# Patient Record
Sex: MALE | Race: WHITE | NOT HISPANIC OR LATINO | URBAN - METROPOLITAN AREA
[De-identification: names, ages, dates, MRNs, and addresses within clinical notes are randomized per-mention and may not be internally consistent; named-entity substitution may affect disease eponyms.]

---

## 2022-05-30 ENCOUNTER — HOSPITAL ENCOUNTER (EMERGENCY)
Facility: HOSPITAL | Age: 27
Discharge: HOME | End: 2022-05-30
Attending: EMERGENCY MEDICINE
Payer: MEDICAID

## 2022-05-30 VITALS
RESPIRATION RATE: 14 BRPM | SYSTOLIC BLOOD PRESSURE: 154 MMHG | HEART RATE: 86 BPM | DIASTOLIC BLOOD PRESSURE: 78 MMHG | WEIGHT: 190 LBS | HEIGHT: 68 IN | BODY MASS INDEX: 28.79 KG/M2 | TEMPERATURE: 98 F | OXYGEN SATURATION: 98 %

## 2022-05-30 DIAGNOSIS — F22 DELUSIONAL DISORDER: Primary | ICD-10-CM

## 2022-05-30 PROCEDURE — 99283 EMERGENCY DEPT VISIT LOW MDM: CPT

## 2022-05-30 RX ORDER — RISPERIDONE 1 MG/1
1 TABLET ORAL 2 TIMES DAILY
Qty: 60 TABLET | Refills: 1 | Status: ON HOLD | OUTPATIENT
Start: 2022-05-30 | End: 2023-11-15 | Stop reason: HOSPADM

## 2022-05-30 NOTE — ED NOTES
Pt presents with disorganized thinking, stating that their is a recorder behind his eye, and that their is black hairs spinning behind his eyes. Pt presents as being calm, non-aggressive. Pt states that he once swallowed a snake and it wrapped around his balls. Pt denies SI or HI.  MD made aware.

## 2022-05-30 NOTE — ED PROVIDER NOTES
Encounter Date: 5/30/2022       History     Chief Complaint   Patient presents with    Eye Problem    Psychiatric Evaluation     Pt reports he has black hairs in his eyes and something moving around in his abdomen, stating he needs a scan. Pt states last time he came here he had a snake wrapped around his balls and we didn't do anything about it. States he takes no medications and has no medical problems     This 26 y/o man presents with c/o black hairs spinning around his eyes cutting into them. He states his head feels hollow. He feels that he he has multiple contact lenses in both eyes. He came today because he states he had a CT of his head but never got the results. 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 He lives with his father. Chart review reveals that last year he had delusional thoughts, paranoia and hallucinations. He denies suicidal ideation or homicidal ideation.         Review of patient's allergies indicates:  No Known Allergies  History reviewed. No pertinent past medical history.  History reviewed. No pertinent surgical history.  History reviewed. No pertinent family history.     Review of Systems   Constitutional: Negative.    HENT: Negative.    Eyes: Positive for visual disturbance.   Respiratory: Negative.    Cardiovascular: Negative.    Gastrointestinal: Negative.    Endocrine: Negative.    Genitourinary: Negative.    Musculoskeletal: Negative.    Skin: Negative.    Allergic/Immunologic: Negative.    Neurological: Negative.    Hematological: Negative.    Psychiatric/Behavioral: Positive for hallucinations.   All other systems reviewed and are negative.      Physical Exam     Initial Vitals [05/30/22 0930]   BP Pulse Resp Temp SpO2   (!) 154/78 86 14 98.3 °F (36.8 °C) 98 %      MAP       --         Physical Exam    Constitutional: He appears well-developed and well-nourished.   HENT:   Head: Normocephalic and  atraumatic.   Mouth/Throat: Mucous membranes are normal.   Eyes: EOM are normal. Pupils are equal, round, and reactive to light.   Neck: Neck supple.   Normal range of motion.  Cardiovascular: Normal rate, regular rhythm, normal heart sounds and intact distal pulses.   Pulmonary/Chest: Breath sounds normal.   Abdominal: Abdomen is soft. Bowel sounds are normal.   Musculoskeletal:         General: Normal range of motion.      Cervical back: Normal range of motion and neck supple.     Neurological: He is alert and oriented to person, place, and time. He has normal strength.   Skin: Skin is warm and dry. Capillary refill takes less than 2 seconds.   Psychiatric: He has a normal mood and affect. His behavior is normal. Judgment normal.         ED Course   Procedures  Labs Reviewed - No data to display       Imaging Results    None          Medications - No data to display                       Clinical Impression:   Final diagnoses:  [F22] Delusional disorder (Primary)          ED Disposition Condition    Discharge Stable        ED Prescriptions     None        Follow-up Information     Follow up With Specialties Details Why Contact Info    [facility], Psychiatry, Psychology Schedule an appointment as soon as possible for a visit   [address] [place] [address] [address] [place] [id number] [phone]             Fly Candelaria MD  05/30/22 1011       Fly Candelaria MD  08/25/22 0982

## 2022-12-28 ENCOUNTER — HOSPITAL ENCOUNTER (EMERGENCY)
Facility: HOSPITAL | Age: 27
Discharge: HOME | End: 2022-12-28
Attending: STUDENT IN AN ORGANIZED HEALTH CARE EDUCATION/TRAINING PROGRAM
Payer: MEDICAID

## 2022-12-28 VITALS
HEIGHT: 63 IN | WEIGHT: 200 LBS | SYSTOLIC BLOOD PRESSURE: 118 MMHG | TEMPERATURE: 98 F | DIASTOLIC BLOOD PRESSURE: 68 MMHG | OXYGEN SATURATION: 98 % | RESPIRATION RATE: 16 BRPM | BODY MASS INDEX: 35.44 KG/M2 | HEART RATE: 78 BPM

## 2022-12-28 DIAGNOSIS — F19.10 DRUG ABUSE: Primary | ICD-10-CM

## 2022-12-28 PROCEDURE — 99282 EMERGENCY DEPT VISIT SF MDM: CPT

## 2022-12-29 NOTE — ED PROVIDER NOTES
"Encounter Date: 12/28/2022       History     Chief Complaint   Patient presents with    medical screening     Pt states he is having "multiple pops all over his body and when he closes his eyes he sees things shooting from his eyes". S/S are ongoing for a year. Denies SI or HI      Patient is a 27-year-old white male no significant past medical history presented to the ER today due to concerns about his eyes.  Patient states that he is struggled with this for several years.  Patient states the light seems to bother his eyes.  Patient states symptom onset was after he "snorted my cousins Adderall. "  Patient states he has been doing this for quite some time.  Patient states he does not to focus but does sometimes affect his eyes.  Per chart review appears patient has been to the ER several times for this.  Patient states he seen ophthalmology for it and says it is nothing wrong with him.  Patient denies any other complaints at this time.  Patient denies any headaches, chest pain, shortness of breath, diplopia, abdominal pain.    Review of patient's allergies indicates:  No Known Allergies  No past medical history on file.  No past surgical history on file.  No family history on file.     Review of Systems   Constitutional:  Negative for chills, fatigue and fever.   HENT:  Negative for congestion, sore throat and trouble swallowing.    Eyes:  Positive for photophobia and redness. Negative for pain, discharge, itching and visual disturbance.   Respiratory:  Negative for cough, shortness of breath and wheezing.    Cardiovascular:  Negative for chest pain and palpitations.   Gastrointestinal:  Negative for abdominal pain, blood in stool, constipation, diarrhea, nausea and vomiting.   Genitourinary:  Negative for dysuria and hematuria.   Musculoskeletal:  Negative for back pain and myalgias.   Skin:  Negative for rash and wound.   Neurological:  Negative for seizures, syncope and headaches.   Psychiatric/Behavioral:  " Negative for confusion. The patient is not nervous/anxious.      Physical Exam     Initial Vitals [12/28/22 2100]   BP Pulse Resp Temp SpO2   139/83 109 16 98.2 °F (36.8 °C) 97 %      MAP       --         Physical Exam    Nursing note and vitals reviewed.  Constitutional: He appears well-developed and well-nourished. No distress.   HENT:   Head: Normocephalic and atraumatic.   Eyes: EOM are normal. Right eye exhibits no discharge. Left eye exhibits no discharge. No scleral icterus.   Injected conjunctiva bilaterally.  Pupils dilated and equal.  Reactive to light.   Neck: No tracheal deviation present.   Normal range of motion.  Cardiovascular:  Normal rate, regular rhythm and normal heart sounds.     Exam reveals no gallop and no friction rub.       No murmur heard.  Pulmonary/Chest: Breath sounds normal. No respiratory distress. He has no wheezes. He has no rhonchi. He has no rales.   Musculoskeletal:         General: Normal range of motion.      Cervical back: Normal range of motion.     Neurological: He is alert.   Skin: Skin is warm and dry. No rash and no abscess noted. No erythema. No pallor.   Psychiatric: His behavior is normal. Judgment normal.       ED Course   Procedures  Labs Reviewed - No data to display       Imaging Results    None          Medications - No data to display  Medical Decision Making:   Initial Assessment:     Overall 27-year-old male seems to be in no acute distress  ED Management:   Vital signs stable patient is afebrile   Pupils equal round and reactive to light but dilated   Injected conjunctiva   Patient states snorted Adderall today and smoked marijuana  Do strongly feel the patient's symptoms are likely due to this and advised him to avoid in the future   Seems that patient has already been evaluated by Ophthalmology for same complaint and cleared medically   Return precautions discussed and follow up with PCP is recommended                        Clinical Impression:   Final  diagnoses:  [F19.10] Drug abuse (Primary)        ED Disposition Condition    Discharge Stable          ED Prescriptions    None       Follow-up Information       Follow up With Specialties Details Why Contact Info    [facility] - Emergency Dept Emergency Medicine  If symptoms worsen [address] [facility] [id number] [phone]    Myriam Nuñez MD Family Medicine Schedule an appointment as soon as possible for a visit   [address] [facility]  [facility] [place] [address]  [phone]               Mark Patterson MD  12/28/22 6093

## 2023-01-27 ENCOUNTER — HOSPITAL ENCOUNTER (EMERGENCY)
Facility: HOSPITAL | Age: 28
Discharge: HOME | End: 2023-01-27
Attending: STUDENT IN AN ORGANIZED HEALTH CARE EDUCATION/TRAINING PROGRAM
Payer: MEDICAID

## 2023-01-27 VITALS
TEMPERATURE: 98 F | OXYGEN SATURATION: 99 % | WEIGHT: 200 LBS | HEART RATE: 78 BPM | DIASTOLIC BLOOD PRESSURE: 92 MMHG | SYSTOLIC BLOOD PRESSURE: 126 MMHG | HEIGHT: 64 IN | BODY MASS INDEX: 34.15 KG/M2 | RESPIRATION RATE: 18 BRPM

## 2023-01-27 DIAGNOSIS — Z00.00 GENERAL MEDICAL EXAM: Primary | ICD-10-CM

## 2023-01-27 DIAGNOSIS — R03.0 ELEVATED BLOOD PRESSURE READING: ICD-10-CM

## 2023-01-27 LAB — POCT GLUCOSE: 80 MG/DL (ref 70–110)

## 2023-01-27 PROCEDURE — 82962 GLUCOSE BLOOD TEST: CPT

## 2023-01-27 PROCEDURE — 99282 EMERGENCY DEPT VISIT SF MDM: CPT

## 2023-01-27 NOTE — ED PROVIDER NOTES
"Encounter Date: 1/27/2023       History     Chief Complaint   Patient presents with    Medical clearence      Pt arrived via [facility], pt states he has been having sensations of the inside of his stomach itching and a popping noise that goes off in my head. Pt states this has been going on for the past year. Pt when questioned about SI, HI or AVH, pt denies SI or HI but does admit to hearing voices all of his life, for which he is currently not on medication for, when pt questioned on what the voices are telling him he states " only to do good things not anything bad" pt would not further elaborate on what the voic     28 yo Pt arrived via [facility], pt states his stomach "itching"  and feels things "moving around" in stomach, denies abd pain, n/v/diarrhea/constipation and a popping noise that goes off in my head. Pt states that all symptoms has been going on for the past year. Pt states he was sleeping in his truck at truck stop and called EMS.Pt when questioned about SI, HI or AVH, pt denies SI or HI but does admit to hearing voices all of his life, for which he is currently not on medication for, when pt questioned on what the voices are telling him he states " only to do good things not anything bad" pt would not further elaborate on what the voice.denies headache, sob,cp, n/v/d/c/or any other symptoms. Pt denies any pain or discomfort currently  .    Review of patient's allergies indicates:  No Known Allergies  History reviewed. No pertinent past medical history.  History reviewed. No pertinent surgical history.  History reviewed. No pertinent family history.     Review of Systems   Constitutional:  Negative for fever.   HENT:  Negative for sore throat.    Respiratory:  Negative for shortness of breath.    Cardiovascular:  Negative for chest pain.   Gastrointestinal:  Negative for nausea.   Genitourinary:  Negative for dysuria.   Musculoskeletal:  Negative for back pain.   Skin:  Negative for rash.   Neurological:  " Negative for weakness.   Hematological:  Does not bruise/bleed easily.   All other systems reviewed and are negative.    Physical Exam     Initial Vitals [01/27/23 0748]   BP Pulse Resp Temp SpO2   (!) 160/97 94 18 98.3 °F (36.8 °C) 97 %      MAP       --         Physical Exam    Constitutional: He appears well-developed and well-nourished.   Poor hygiene, holes in pants,   HENT:   Head: Normocephalic.   Eyes: EOM are normal. Pupils are equal, round, and reactive to light.   Neck:   Normal range of motion.  Cardiovascular:  Normal rate, regular rhythm and normal pulses.           Pulmonary/Chest: Breath sounds normal. No respiratory distress.   Abdominal: Abdomen is soft. Bowel sounds are normal. There is no abdominal tenderness.   Musculoskeletal:         General: Normal range of motion.      Cervical back: Normal range of motion.     Neurological: He is alert and oriented to person, place, and time. GCS score is 15. GCS eye subscore is 4. GCS verbal subscore is 5. GCS motor subscore is 6.   Skin: Skin is warm. Capillary refill takes less than 2 seconds.   Psychiatric: He has a normal mood and affect.       ED Course   Procedures  Labs Reviewed   POCT GLUCOSE          Imaging Results    None          Medications - No data to display  Medical Decision Making:   Initial Assessment:   26 yo otherwise well appearing male  Differential Diagnosis:   Substance abuse, GERD, migraine, hypertension  Clinical Tests:   Lab Tests: Ordered  ED Management:  Patient does not have any acute symptoms in ED today, explained that he would benefit from obtaining a primary care physician following up with his current listed problems primary physician.  Patient denies any pain or discomfort at this time CBG 80.  Does have a history of mental on a so this could be playing a part in his symptoms, no acute issues at this time, VSS except for elevated BP,  will discharge home with follow-up with PCP, ER precautions.                         Clinical Impression:   Final diagnoses:  [Z00.00] General medical exam (Primary)  [R03.0] Elevated blood pressure reading        ED Disposition Condition    Discharge Stable          ED Prescriptions    None       Follow-up Information       Follow up With Specialties Details Why Contact Info    Myriam Nuñez MD Family Medicine In 1 week  [address]  [place] [address]  [phone]               Danyelle Ward MD  01/27/23 0819

## 2023-11-12 ENCOUNTER — HOSPITAL ENCOUNTER (INPATIENT)
Facility: HOSPITAL | Age: 28
LOS: 4 days | Discharge: HOME | DRG: 885 | End: 2023-11-16
Attending: PSYCHIATRY & NEUROLOGY | Admitting: PSYCHIATRY & NEUROLOGY
Payer: MEDICAID

## 2023-11-12 DIAGNOSIS — F29 PSYCHOSIS, UNSPECIFIED PSYCHOSIS TYPE: ICD-10-CM

## 2023-11-12 PROCEDURE — 12400001 HC PSYCH SEMI-PRIVATE ROOM

## 2023-11-12 RX ORDER — IBUPROFEN 200 MG
1 TABLET ORAL DAILY
Status: DISCONTINUED | OUTPATIENT
Start: 2023-11-13 | End: 2023-11-16 | Stop reason: HOSPADM

## 2023-11-12 RX ORDER — LORAZEPAM 1 MG/1
2 TABLET ORAL EVERY 4 HOURS PRN
Status: DISCONTINUED | OUTPATIENT
Start: 2023-11-12 | End: 2023-11-16 | Stop reason: HOSPADM

## 2023-11-12 RX ORDER — TRAZODONE HYDROCHLORIDE 100 MG/1
100 TABLET ORAL NIGHTLY PRN
Status: DISCONTINUED | OUTPATIENT
Start: 2023-11-12 | End: 2023-11-16 | Stop reason: HOSPADM

## 2023-11-12 RX ORDER — HYDROXYZINE HYDROCHLORIDE 50 MG/1
50 TABLET, FILM COATED ORAL EVERY 4 HOURS PRN
Status: DISCONTINUED | OUTPATIENT
Start: 2023-11-12 | End: 2023-11-16 | Stop reason: HOSPADM

## 2023-11-12 RX ORDER — HALOPERIDOL 5 MG/1
10 TABLET ORAL EVERY 4 HOURS PRN
Status: DISCONTINUED | OUTPATIENT
Start: 2023-11-12 | End: 2023-11-16 | Stop reason: HOSPADM

## 2023-11-12 RX ORDER — LORAZEPAM 2 MG/ML
2 INJECTION INTRAMUSCULAR EVERY 4 HOURS PRN
Status: DISCONTINUED | OUTPATIENT
Start: 2023-11-12 | End: 2023-11-16 | Stop reason: HOSPADM

## 2023-11-12 RX ORDER — HALOPERIDOL 5 MG/ML
10 INJECTION INTRAMUSCULAR EVERY 4 HOURS PRN
Status: DISCONTINUED | OUTPATIENT
Start: 2023-11-12 | End: 2023-11-16 | Stop reason: HOSPADM

## 2023-11-12 RX ORDER — DIPHENHYDRAMINE HYDROCHLORIDE 50 MG/ML
50 INJECTION INTRAMUSCULAR; INTRAVENOUS EVERY 4 HOURS PRN
Status: DISCONTINUED | OUTPATIENT
Start: 2023-11-12 | End: 2023-11-16 | Stop reason: HOSPADM

## 2023-11-12 RX ORDER — ACETAMINOPHEN 325 MG/1
650 TABLET ORAL EVERY 6 HOURS PRN
Status: DISCONTINUED | OUTPATIENT
Start: 2023-11-12 | End: 2023-11-16 | Stop reason: HOSPADM

## 2023-11-12 RX ORDER — DIPHENHYDRAMINE HCL 50 MG
50 CAPSULE ORAL EVERY 4 HOURS PRN
Status: DISCONTINUED | OUTPATIENT
Start: 2023-11-12 | End: 2023-11-16 | Stop reason: HOSPADM

## 2023-11-12 RX ORDER — MAG HYDROX/ALUMINUM HYD/SIMETH 200-200-20
30 SUSPENSION, ORAL (FINAL DOSE FORM) ORAL EVERY 6 HOURS PRN
Status: DISCONTINUED | OUTPATIENT
Start: 2023-11-12 | End: 2023-11-16 | Stop reason: HOSPADM

## 2023-11-13 LAB
BASOPHILS # BLD AUTO: 0.04 X10(3)/MCL
BASOPHILS NFR BLD AUTO: 0.7 %
CHOLEST SERPL-MCNC: 144 MG/DL
CHOLEST/HDLC SERPL: 6 {RATIO} (ref 0–5)
EOSINOPHIL # BLD AUTO: 0.16 X10(3)/MCL (ref 0–0.9)
EOSINOPHIL NFR BLD AUTO: 2.6 %
ERYTHROCYTE [DISTWIDTH] IN BLOOD BY AUTOMATED COUNT: 13.7 % (ref 11.5–17)
EST. AVERAGE GLUCOSE BLD GHB EST-MCNC: 96.8 MG/DL
HBA1C MFR BLD: 5 %
HCT VFR BLD AUTO: 51.7 % (ref 42–52)
HDLC SERPL-MCNC: 26 MG/DL (ref 35–60)
HGB BLD-MCNC: 17.4 G/DL (ref 14–18)
IMM GRANULOCYTES # BLD AUTO: 0.05 X10(3)/MCL (ref 0–0.04)
IMM GRANULOCYTES NFR BLD AUTO: 0.8 %
LDLC SERPL CALC-MCNC: 94 MG/DL (ref 50–140)
LYMPHOCYTES # BLD AUTO: 1.89 X10(3)/MCL (ref 0.6–4.6)
LYMPHOCYTES NFR BLD AUTO: 31.2 %
MCH RBC QN AUTO: 31.4 PG (ref 27–31)
MCHC RBC AUTO-ENTMCNC: 33.7 G/DL (ref 33–36)
MCV RBC AUTO: 93.3 FL (ref 80–94)
MONOCYTES # BLD AUTO: 0.81 X10(3)/MCL (ref 0.1–1.3)
MONOCYTES NFR BLD AUTO: 13.4 %
NEUTROPHILS # BLD AUTO: 3.11 X10(3)/MCL (ref 2.1–9.2)
NEUTROPHILS NFR BLD AUTO: 51.3 %
NRBC BLD AUTO-RTO: 0 %
PLATELET # BLD AUTO: 326 X10(3)/MCL (ref 130–400)
PMV BLD AUTO: 9.7 FL (ref 7.4–10.4)
RBC # BLD AUTO: 5.54 X10(6)/MCL (ref 4.7–6.1)
T PALLIDUM AB SER QL: NONREACTIVE
TRIGL SERPL-MCNC: 122 MG/DL (ref 34–140)
VLDLC SERPL CALC-MCNC: 24 MG/DL
WBC # SPEC AUTO: 6.06 X10(3)/MCL (ref 4.5–11.5)

## 2023-11-13 PROCEDURE — 25000003 PHARM REV CODE 250: Performed by: PEDIATRICS

## 2023-11-13 PROCEDURE — 83036 HEMOGLOBIN GLYCOSYLATED A1C: CPT | Performed by: PSYCHIATRY & NEUROLOGY

## 2023-11-13 PROCEDURE — 11400000 HC PSYCH PRIVATE ROOM

## 2023-11-13 PROCEDURE — 85025 COMPLETE CBC W/AUTO DIFF WBC: CPT | Performed by: PSYCHIATRY & NEUROLOGY

## 2023-11-13 PROCEDURE — 86780 TREPONEMA PALLIDUM: CPT | Performed by: PSYCHIATRY & NEUROLOGY

## 2023-11-13 PROCEDURE — 80061 LIPID PANEL: CPT | Performed by: PSYCHIATRY & NEUROLOGY

## 2023-11-13 RX ORDER — LISINOPRIL 10 MG/1
10 TABLET ORAL DAILY
Status: DISCONTINUED | OUTPATIENT
Start: 2023-11-13 | End: 2023-11-16 | Stop reason: HOSPADM

## 2023-11-13 RX ADMIN — LISINOPRIL 10 MG: 10 TABLET ORAL at 01:11

## 2023-11-13 NOTE — PLAN OF CARE
Behavioral Health Unit  Psychosocial History and Assessment  Progress Note      Patient Name: Ty Mello YOB: 1995 SW: Lili Foster Date: 11/13/2023    Chief Complaint: depression    Consent:     Did the patient consent for an interview with the ? Yes    Did the patient consent for the  to contact family/friend/caregiver?   No    Did the patient give consent for the  to inform family/friend/caregiver of his/her whereabouts or to discuss discharge planning? No    Source of Information: Face to face with patient    Is information obtained from interviews considered reliable?   no    Reason for Admission:     There are no hospital problems to display for this patient.      History of Present Illness - (Patient Perception):   I'm just really depressed, women make me depressed, I want be in love but I'm scared        Present biopsychosocial functioning: moderate symptoms    Past biopsychosocial functioning: history of higher functioning    Family and Marital/Relationship History:     Significant Other/Partner Relationships:  Single:  no children    Family Relationships: Estranged      Childhood History:     Where was [place] raised[place]    Who raised the patient? mom      How does patient describe their childhood? It was alright      Who is patient's primary support person? nobody      Culture and Yazidi:     Yazidi: Non-Yazdanism    How strong of a role does Christianity and spirituality play in patient's life? Very strong    Moravian or spiritual concerns regarding treatment: alternative therapies , family role identities , and spiritual concerns / distress    History of Abuse:   History of Abuse: Denies      Outcome: denies    Psychiatric and Medical History:     History of psychiatric illness or treatment: none    Medical history: No past medical history on file.    Substance Abuse History:     Alcohol - (Patient Perspective): pt states that he  drinks about 2-3 times a weeks  Social History     Substance and Sexual Activity   Alcohol Use None         Drugs - (Patient Perspective): pt states that he uses about once or twice a week  Social History     Substance and Sexual Activity   Drug Use Not on file         Education:     Currently Enrolled? No  High School (9-12) or GED Senior  year    Special Education? Yes    Interested in Completing Education/GED: No    Employment and Financial:     Currently employed? Employed: Current Occupation: , cut yards    Source of Income: salary    Able to afford basic needs (food, shelter, utilities)? Yes    Who manages finances/personal affairs? self      Service:     ? no    Combat Service? No     Community Resources:     Describe present use of community resources: inpatient and outpatient mental health     Identify previously used community resources   (Include previous mental health treatment - outpatient and inpatient): none    Environmental:     Current living situation:lives with sister    Social Evaluation:     Patient Assets: General fund of knowledge    Patient Limitations: poor coping skills, potential for relapse    High risk psychosocial issues that may impact discharge planning:   None noted    Recommendations:     Anticipated discharge plan:   outpatient follow up; bike [facility]    High risk issues requiring early treatment planning and immediate intervention: none at this time    Community resources needed for discharge planning:  aftercare treatment sources    Anticipated social work role(s) in treatment and discharge planning: advice and [place], groups, individual as needed, referral to aftercare.    11/13/23 1127   Initial Information   Source of Information patient   Reason for Admission depression   Arrived From emergency department   Spiritual Beliefs   Spiritual, Cultural Beliefs, Jew Practices, Values that Affect Care yes   Description of Beliefs that Will Affect Care  Advent   Substance Use/Withdrawal   Substance Use Current, used prior to admission   Additional Tobacco Use   How many cigarettes do you typically have per day? 20   Abuse Screen (yes response referral indicated)   Feels Unsafe at Home or Work/School no   Feels Threatened by Someone no   Does anyone try to keep you from having contact with others or doing things outside your home? no   Physical Signs of Abuse Present no   Abuse Details   Physical Abuse No   Sexual Abuse No   Emotional Abuse No   If applicable, has the abuse been reported? No   AUDIT-C (Alcohol Use Disorders ID Test)   Alcohol Use In Past Year 3-->two to three times per week   Alcohol Amount Per Day In Past Year 1-->three or four   More Than 6 Drinks On One Occasion In Past Year 1-->less than monthly   Total Audit C Score 5

## 2023-11-13 NOTE — NURSING
Pt seen by Dr Skaggs, blood pressure was 165/107, Dr Skaggs ordered Lisinopril 10 mg PO qday, pt rec'd his first dose, will monitor for effectiveness.

## 2023-11-13 NOTE — PROGRESS NOTES
Ty refused both TR groups despite encouragement; Alternative material was offered   11/13/23 1500   U Group Therapy   Group Name Therapeutic Recreation   Specific Interventions Skilled Activity Creative Expression   Participation Level None   Participation Quality Refused

## 2023-11-13 NOTE — PLAN OF CARE
Problem: Adult Inpatient Plan of Care  Goal: Plan of Care Review  Outcome: Ongoing, Progressing  Goal: Patient-Specific Goal (Individualized)  Outcome: Ongoing, Progressing  Goal: Absence of Hospital-Acquired Illness or Injury  Outcome: Ongoing, Progressing  Goal: Optimal Comfort and Wellbeing  Outcome: Ongoing, Progressing  Goal: Readiness for Transition of Care  Outcome: Ongoing, Progressing     Problem: Behavior Regulation Impairment (Psychotic Signs/Symptoms)  Goal: Improved Behavioral Control (Psychotic Signs/Symptoms)  Outcome: Ongoing, Progressing     Problem: Cognitive Impairment (Psychotic Signs/Symptoms)  Goal: Optimal Cognitive Function (Psychotic Signs/Symptoms)  Outcome: Ongoing, Progressing     Problem: Decreased Participation and Engagement (Psychotic Signs/Symptoms)  Goal: Increased Participation and Engagement (Psychotic Signs/Symptoms)  Outcome: Ongoing, Progressing     Problem: Mood Impairment (Psychotic Signs/Symptoms)  Goal: Improved Mood Symptoms (Psychotic Signs/Symptoms)  Outcome: Ongoing, Progressing     Problem: Psychomotor Impairment (Psychotic Signs/Symptoms)  Goal: Improved Psychomotor Symptoms (Psychotic Signs/Symptoms)  Outcome: Ongoing, Progressing     Problem: Sensory Perception Impairment (Psychotic Signs/Symptoms)  Goal: Decreased Sensory Symptoms (Psychotic Signs/Symptoms)  Outcome: Ongoing, Progressing     Problem: Sleep Disturbance (Psychotic Signs/Symptoms)  Goal: Improved Sleep (Psychotic Signs/Symptoms)  Outcome: Ongoing, Progressing     Problem: Social, Occupational or Functional Impairment (Psychotic Signs/Symptoms)  Goal: Enhanced Social, Occupational or Functional Skills (Psychotic Signs/Symptoms)  Outcome: Ongoing, Progressing     Problem: Bariatric Environmental Safety  Goal: Safety Maintained with Care  Outcome: Ongoing, Progressing

## 2023-11-13 NOTE — H&P
[facility]  History & Physical    Subjective:      Chief Complaint/Reason for Admission: delusions     Ty Mello is a 28 y.o. male. Delusions and substance abuse     No past medical history on file.  No past surgical history on file.  No family history on file.       PTA Medications   Medication Sig    risperiDONE (RISPERDAL) 1 MG tablet Take 1 tablet (1 mg total) by mouth 2 (two) times daily.     Review of patient's allergies indicates:  No Known Allergies     Review of Systems   Constitutional: Negative.    HENT: Negative.     Eyes: Negative.    Respiratory: Negative.     Cardiovascular: Negative.    Gastrointestinal: Negative.    Genitourinary: Negative.    Musculoskeletal: Negative.    Skin: Negative.    Neurological: Negative.    Endo/Heme/Allergies: Negative.    Psychiatric/Behavioral:  Positive for hallucinations and substance abuse. Negative for depression and suicidal ideas. The patient is nervous/anxious.        Objective:      Vital Signs (Most Recent)  Temp: 98.2 °F (36.8 °C) (11/13/23 0700)  Pulse: 69 (11/13/23 0700)  Resp: 18 (11/13/23 0700)  BP: 135/85 (11/13/23 0700)  SpO2: 100 % (11/13/23 0700)    Vital Signs Range (Last 24H):  Temp:  [97.7 °F (36.5 °C)-98.2 °F (36.8 °C)]   Pulse:  []   Resp:  [18-19]   BP: (135-150)/(82-97)   SpO2:  [98 %-100 %]     Physical Exam  HENT:      Head: Normocephalic.      Right Ear: Tympanic membrane normal.      Left Ear: Tympanic membrane normal.      Nose: Nose normal.      Mouth/Throat:      Mouth: Mucous membranes are moist.   Eyes:      Extraocular Movements: Extraocular movements intact.      Pupils: Pupils are equal, round, and reactive to light.   Cardiovascular:      Rate and Rhythm: Normal rate and regular rhythm.   Pulmonary:      Effort: Pulmonary effort is normal.   Abdominal:      General: Abdomen is flat.   Musculoskeletal:         General: Normal range of motion.   Skin:     General: Skin is warm.    Neurological:      General: No focal deficit present.      Mental Status: He is alert and oriented to person, place, and time.      Comments: Vision normal   Hearing normal   EOM intact   Face muscles normal  Facial sensation normal   Shrugs shoulders  Tongue midline            Data Review:    Recent Results (from the past 48 hour(s))   Comprehensive metabolic panel    Collection Time: 11/12/23  2:08 PM   Result Value Ref Range    Sodium Level 138 136 - 145 mmol/L    Potassium Level 4.5 3.5 - 5.1 mmol/L    Chloride 103 98 - 107 mmol/L    Carbon Dioxide 21 (L) 22 - 29 mmol/L    Glucose Level 73 (L) 74 - 100 mg/dL    Blood Urea Nitrogen 11.5 8.9 - 20.6 mg/dL    Creatinine 0.96 0.73 - 1.18 mg/dL    Calcium Level Total 9.7 8.4 - 10.2 mg/dL    Protein Total 8.1 6.4 - 8.3 gm/dL    Albumin Level 4.5 3.5 - 5.0 g/dL    Globulin 3.6 (H) 2.4 - 3.5 gm/dL    Albumin/Globulin Ratio 1.3 1.1 - 2.0 ratio    Bilirubin Total 0.4 <=1.5 mg/dL    Alkaline Phosphatase 61 40 - 150 unit/L    Alanine Aminotransferase 35 0 - 55 unit/L    Aspartate Aminotransferase 31 5 - 34 unit/L    eGFR >60 mls/min/1.73/m2   CBC with Differential    Collection Time: 11/12/23  2:08 PM   Result Value Ref Range    WBC 10.30 4.50 - 11.50 x10(3)/mcL    RBC 5.24 4.70 - 6.10 x10(6)/mcL    Hgb 16.5 14.0 - 18.0 g/dL    Hct 48.5 42.0 - 52.0 %    MCV 92.6 80.0 - 94.0 fL    MCH 31.5 (H) 27.0 - 31.0 pg    MCHC 34.0 33.0 - 36.0 g/dL    RDW 13.8 11.5 - 17.0 %    Platelet 297 130 - 400 x10(3)/mcL    MPV 9.5 7.4 - 10.4 fL    Neut % 71.2 %    Lymph % 18.4 %    Mono % 9.2 %    Eos % 0.2 %    Basophil % 0.3 %    Lymph # 1.90 0.6 - 4.6 x10(3)/mcL    Neut # 7.33 2.1 - 9.2 x10(3)/mcL    Mono # 0.95 0.1 - 1.3 x10(3)/mcL    Eos # 0.02 0 - 0.9 x10(3)/mcL    Baso # 0.03 <=0.2 x10(3)/mcL    IG# 0.07 (H) 0 - 0.04 x10(3)/mcL    IG% 0.7 %    NRBC% 0.0 %   Urinalysis, Reflex to Urine Culture    Collection Time: 11/12/23  2:40 PM    Specimen: Urine   Result Value Ref Range    Color, UA  Yellow Yellow, Light-Yellow, Straw, Dark-Yellow    Appearance, UA Clear Clear    Specific Gravity, UA 1.031 (H) 1.005 - 1.030    pH, UA 5.5 5.0 - 8.5    Protein, UA Trace (A) Negative    Glucose, UA Normal Negative, Normal    Ketones, UA 3+ (A) Negative    Blood, UA 1+ (A) Negative    Bilirubin, UA Negative Negative    Urobilinogen, UA 2.0 (A) 0.2, 1.0, Normal    Nitrites, UA Negative Negative    Leukocyte Esterase, UA Negative Negative    WBC, UA 0-5 None Seen, 0-2, 3-5, 0-5 /HPF    Bacteria, UA None Seen None Seen, Trace /HPF    Squamous Epithelial Cells, UA None Seen None Seen /HPF    Mucous, UA Trace (A) None Seen /LPF    Hyaline Casts, UA 0-2 (A) None Seen /lpf    RBC, UA 11-20 (A) None Seen, 0-2, 3-5, 0-5 /HPF   Drug Screen, Urine    Collection Time: 11/12/23  2:40 PM   Result Value Ref Range    Amphetamines, Urine Positive (A) Negative    Barbituates, Urine Negative Negative    Benzodiazepine, Urine Negative Negative    Cannabinoids, Urine Negative Negative    Cocaine, Urine Positive (A) Negative    Fentanyl, Urine Negative Negative    MDMA, Urine Negative Negative    Opiates, Urine Negative Negative    Phencyclidine, Urine Negative Negative    pH, Urine 5.5 3.0 - 11.0    Specific Gravity, Urine Auto 1.031 1.001 - 1.035   COVID/FLU A&B PCR    Collection Time: 11/12/23  2:41 PM   Result Value Ref Range    Influenza A PCR Not Detected Not Detected    Influenza B PCR Not Detected Not Detected    SARS-CoV-2 PCR Not Detected Not Detected, Negative, Invalid   CBC with Differential    Collection Time: 11/13/23  8:01 AM   Result Value Ref Range    WBC 6.06 4.50 - 11.50 x10(3)/mcL    RBC 5.54 4.70 - 6.10 x10(6)/mcL    Hgb 17.4 14.0 - 18.0 g/dL    Hct 51.7 42.0 - 52.0 %    MCV 93.3 80.0 - 94.0 fL    MCH 31.4 (H) 27.0 - 31.0 pg    MCHC 33.7 33.0 - 36.0 g/dL    RDW 13.7 11.5 - 17.0 %    Platelet 326 130 - 400 x10(3)/mcL    MPV 9.7 7.4 - 10.4 fL    Neut % 51.3 %    Lymph % 31.2 %    Mono % 13.4 %    Eos % 2.6 %     Basophil % 0.7 %    Lymph # 1.89 0.6 - 4.6 x10(3)/mcL    Neut # 3.11 2.1 - 9.2 x10(3)/mcL    Mono # 0.81 0.1 - 1.3 x10(3)/mcL    Eos # 0.16 0 - 0.9 x10(3)/mcL    Baso # 0.04 <=0.2 x10(3)/mcL    IG# 0.05 (H) 0 - 0.04 x10(3)/mcL    IG% 0.8 %    NRBC% 0.0 %   Lipid Panel    Collection Time: 11/13/23  8:01 AM   Result Value Ref Range    Cholesterol Total 144 <=200 mg/dL    HDL Cholesterol 26 (L) 35 - 60 mg/dL    Triglyceride 122 34 - 140 mg/dL    Cholesterol/HDL Ratio 6 (H) 0 - 5    Very Low Density Lipoprotein 24     LDL Cholesterol 94.00 50.00 - 140.00 mg/dL   Hemoglobin A1C    Collection Time: 11/13/23  8:01 AM   Result Value Ref Range    Hemoglobin A1c 5.0 <=7.0 %    Estimated Average Glucose 96.8 mg/dL        No results found.       Assessment and Plan       Substance abuse   Essential hypertension- borderline

## 2023-11-13 NOTE — H&P
"11/13/2023  Ty Mello   1995   [id number]            Psychiatry Inpatient Admission Note    Date of Admission: 11/12/2023  6:36 PM    Current Legal Status: Physician's Emergency Certificate    Chief Complaint: "Life"    SUBJECTIVE:   History of Present Illness:   Ty Mello is a 28 y.o. male placed under a PEC at [facility] due to hallucinations and delusions.    Patient had originally presented to the ED for headache.  Apparently, while there, he reported that his "face was melting off."  He had also reported to the ED that he "had a snake in his body 5 years ago."  He does report that he has been depressed due to "life."  Denies acute hallucinations.  States that he needs to get back home because he recently started a job.  Denies thoughts of self-harm or harm to others.  Will admit to inpatient unit and will monitor behavior and safety.    UDS: (+)cocaine, amphetamine  Blood alcohol: <10      Past Psychiatric History:   Previous Psychiatric Hospitalizations: Denies   Previous Medication Trials: Denies  Previous Suicide Attempts: Denies   Outpatient psychiatrist: Denies    Past Medical/Surgical History:   No past medical history on file.  No past surgical history on file.      Family Psychiatric History:   Sister - "Everything you can think off."     Allergies:   Review of patient's allergies indicates:  No Known Allergies    Substance Abuse History:   Tobacco: 1 ppd  Alcohol: Every 2-3 days, 2-3 half pints  Illicit Substances: Cannabis, Adderall  Treatment: Denies      Current Medications:   Home Psychiatric Meds: Denies    Scheduled Meds:    nicotine  1 patch Transdermal Daily      PRN Meds: acetaminophen, aluminum-magnesium hydroxide-simethicone, haloperidoL **AND** diphenhydrAMINE **AND** LORazepam **AND** haloperidol lactate **AND** diphenhydrAMINE **AND** lorazepam, hydrOXYzine HCL, traZODone   Psychotherapeutics (From admission, onward)      Start     Stop Route Frequency Ordered    11/12/23 4268  " traZODone tablet 100 mg         -- Oral Nightly PRN 11/12/23 1837    11/12/23 1837  haloperidoL tablet 10 mg  (Med - Acute  Behavioral Management)        See Hyperspace for full Linked Orders Report.    -- Oral Every 4 hours PRN 11/12/23 1837    11/12/23 1837  LORazepam tablet 2 mg  (Med - Acute  Behavioral Management)        See Hyperspace for full Linked Orders Report.    -- Oral Every 4 hours PRN 11/12/23 1837    11/12/23 1837  haloperidol lactate injection 10 mg  (Med - Acute  Behavioral Management)        See Hyperspace for full Linked Orders Report.    -- IM Every 4 hours PRN 11/12/23 1837    11/12/23 1837  LORazepam injection 2 mg  (Med - Acute  Behavioral Management)        See Hyperspace for full Linked Orders Report.    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   Pending charges: Denies      OBJECTIVE:   Medical Review Of Systems:  Constitutional: negative  Respiratory: negative  Cardiovascular: negative  Gastrointestinal: negative  Genitourinary:negative  Musculoskeletal:negative  Neurological: negative     Vitals   Vitals:    11/13/23 0700   BP: 135/85   Pulse: 69   Resp: 18   Temp: 98.2 °F (36.8 °C)        Labs/Imaging/Studies:   Recent Results (from the past 48 hour(s))   Comprehensive metabolic panel    Collection Time: 11/12/23  2:08 PM   Result Value Ref Range    Sodium Level 138 136 - 145 mmol/L    Potassium Level 4.5 3.5 - 5.1 mmol/L    Chloride 103 98 - 107 mmol/L    Carbon Dioxide 21 (L) 22 - 29 mmol/L    Glucose Level 73 (L) 74 - 100 mg/dL    Blood Urea Nitrogen 11.5 8.9 - 20.6 mg/dL     Creatinine 0.96 0.73 - 1.18 mg/dL    Calcium Level Total 9.7 8.4 - 10.2 mg/dL    Protein Total 8.1 6.4 - 8.3 gm/dL    Albumin Level 4.5 3.5 - 5.0 g/dL    Globulin 3.6 (H) 2.4 - 3.5 gm/dL    Albumin/Globulin Ratio 1.3 1.1 - 2.0 ratio    Bilirubin Total 0.4 <=1.5 mg/dL    Alkaline Phosphatase 61 40 - 150 unit/L    Alanine Aminotransferase 35 0 - 55 unit/L    Aspartate Aminotransferase 31 5 - 34 unit/L    eGFR >60 mls/min/1.73/m2   CBC with Differential    Collection Time: 11/12/23  2:08 PM   Result Value Ref Range    WBC 10.30 4.50 - 11.50 x10(3)/mcL    RBC 5.24 4.70 - 6.10 x10(6)/mcL    Hgb 16.5 14.0 - 18.0 g/dL    Hct 48.5 42.0 - 52.0 %    MCV 92.6 80.0 - 94.0 fL    MCH 31.5 (H) 27.0 - 31.0 pg    MCHC 34.0 33.0 - 36.0 g/dL    RDW 13.8 11.5 - 17.0 %    Platelet 297 130 - 400 x10(3)/mcL    MPV 9.5 7.4 - 10.4 fL    Neut % 71.2 %    Lymph % 18.4 %    Mono % 9.2 %    Eos % 0.2 %    Basophil % 0.3 %    Lymph # 1.90 0.6 - 4.6 x10(3)/mcL    Neut # 7.33 2.1 - 9.2 x10(3)/mcL    Mono # 0.95 0.1 - 1.3 x10(3)/mcL    Eos # 0.02 0 - 0.9 x10(3)/mcL    Baso # 0.03 <=0.2 x10(3)/mcL    IG# 0.07 (H) 0 - 0.04 x10(3)/mcL    IG% 0.7 %    NRBC% 0.0 %   Urinalysis, Reflex to Urine Culture    Collection Time: 11/12/23  2:40 PM    Specimen: Urine   Result Value Ref Range    Color, UA Yellow Yellow, Light-Yellow, Straw, Dark-Yellow    Appearance, UA Clear Clear    Specific Gravity, UA 1.031 (H) 1.005 - 1.030    pH, UA 5.5 5.0 - 8.5    Protein, UA Trace (A) Negative    Glucose, UA Normal Negative, Normal    Ketones, UA 3+ (A) Negative    Blood, UA 1+ (A) Negative    Bilirubin, UA Negative Negative    Urobilinogen, UA 2.0 (A) 0.2, 1.0, Normal    Nitrites, UA Negative Negative    Leukocyte Esterase, UA Negative Negative    WBC, UA 0-5 None Seen, 0-2, 3-5, 0-5 /HPF    Bacteria, UA None Seen None Seen, Trace /HPF    Squamous Epithelial Cells, UA None Seen None Seen /HPF    Mucous, UA Trace (A) None Seen /LPF    Hyaline Casts, UA 0-2 (A) None Seen  "/lpf    RBC, UA 11-20 (A) None Seen, 0-2, 3-5, 0-5 /HPF   Drug Screen, Urine    Collection Time: 11/12/23  2:40 PM   Result Value Ref Range    Amphetamines, Urine Positive (A) Negative    Barbituates, Urine Negative Negative    Benzodiazepine, Urine Negative Negative    Cannabinoids, Urine Negative Negative    Cocaine, Urine Positive (A) Negative    Fentanyl, Urine Negative Negative    MDMA, Urine Negative Negative    Opiates, Urine Negative Negative    Phencyclidine, Urine Negative Negative    pH, Urine 5.5 3.0 - 11.0    Specific Gravity, Urine Auto 1.031 1.001 - 1.035   COVID/FLU A&B PCR    Collection Time: 11/12/23  2:41 PM   Result Value Ref Range    Influenza A PCR Not Detected Not Detected    Influenza B PCR Not Detected Not Detected    SARS-CoV-2 PCR Not Detected Not Detected, Negative, Invalid   CBC with Differential    Collection Time: 11/13/23  8:01 AM   Result Value Ref Range    WBC 6.06 4.50 - 11.50 x10(3)/mcL    RBC 5.54 4.70 - 6.10 x10(6)/mcL    Hgb 17.4 14.0 - 18.0 g/dL    Hct 51.7 42.0 - 52.0 %    MCV 93.3 80.0 - 94.0 fL    MCH 31.4 (H) 27.0 - 31.0 pg    MCHC 33.7 33.0 - 36.0 g/dL    RDW 13.7 11.5 - 17.0 %    Platelet 326 130 - 400 x10(3)/mcL    MPV 9.7 7.4 - 10.4 fL    Neut % 51.3 %    Lymph % 31.2 %    Mono % 13.4 %    Eos % 2.6 %    Basophil % 0.7 %    Lymph # 1.89 0.6 - 4.6 x10(3)/mcL    Neut # 3.11 2.1 - 9.2 x10(3)/mcL    Mono # 0.81 0.1 - 1.3 x10(3)/mcL    Eos # 0.16 0 - 0.9 x10(3)/mcL    Baso # 0.04 <=0.2 x10(3)/mcL    IG# 0.05 (H) 0 - 0.04 x10(3)/mcL    IG% 0.8 %    NRBC% 0.0 %      No results found for: "PHENYTOIN", "PHENOBARB", "VALPROATE", "CBMZ"        Psychiatric Mental Status Exam:  General Appearance: appears stated age, well-developed, well-nourished  Arousal: alert  Behavior: cooperative  Movements and Motor Activity: no abnormal involuntary movements noted  Orientation: oriented to person, place, time, and situation  Speech: normal rate, normal rhythm, normal volume, normal " "tone  Mood: "Depressed"  Affect: constricted  Thought Process: linear  Associations: intact  Thought Content and Perceptions: no suicidal ideation, no homicidal ideation, denies auditory hallucinations, denies visual hallucinations, denies paranoid ideation, no ideas of reference  Recent and Remote Memory: recent memory intact, remote memory intact; per interview/observation with patient  Attention and Concentration: intact, attentive to conversation; per interview/observation with patient  Fund of Knowledge: intact, aware of current events, vocabulary appropriate; based on history, vocabulary, fund of knowledge, syntax, grammar, and content  Insight: questionable; based on understanding of severity of illness and HPI  Judgment: questionable; based on patient's behavior and HPI        Patient Strengths:  Access to care and Able to verbalize needs      Patient Liabilities:  Substance use, Depression, and Psychosis      Discharge Criteria:  Improved thought process, Medication compliance, and Overall functional improvement      Reason for Admission:  Depression.  Monitoring    ASSESSMENT/PLAN:   Diagnoses:  Unspecified Psychotic Disorder (F29)  Depression (F32.9)  Amphetamine use disorder (F15.20)  Cocaine use disorder (F14.10)    No past medical history on file.       Problem lists and Management Plans:  -Admit to [facility]  -Will attempt to obtain outside psychiatric records if available  - to assist with aftercare planning and collateral  -Continue inpatient treatment as evidenced by hallucinations and Depression      Psychosis, acute  -Will monitor    Depression, acute  -Zoloft 50mg daily    Amphetamine use, acute  -Group/Individual psychotherapy    Cocaine use, acute  -Group/Individual psychotherapy      Estimated length of stay: 5-7 days    Estimated Disposition: Home    Estimated Follow-up: Outpatient medication management      On this date, I have reviewed the medical history and Nursing Assessment, as " well as records from referral source.  I have evaluated the mental status of the above named person and concur with the findings of all assessments.  I have provided medical direction for the development of the Treatment Plan.    I conclude that this patient meets admission criteria for inpatient treatment.  I certify that this patient poses a danger to self or others, or would otherwise be considered gravely disabled based on this assessment and/or provided collateral information.     I have provided medical direction for the development of the Treatment plan.  These services will be provided while this patient is under my care and will be based on an individualized plan of care.  The patient can demonstrate a reasonable expectation of improvement in his/her disorder as a result of the active treatment being provided.      Damien Marshall M.D.

## 2023-11-13 NOTE — GROUP NOTE
Group Psychotherapy       Group Focus: Discharge Planninig      Number of patients in attendance: 8    Reviewed discharge planning, treatment planning, and community resources. Handouts were provided to all group participants as well as those not in attendance.     Group Start Time: 1045  Group End Time:  1130  Groups Date: 11/13/2023  Group Topic:  Behavioral Health  Group Department: [facility]  Group Facilitators:  Shayla Rodriguez SSW   _____________________________________________________________________    Patient Name: Ty Mello  MRN: [id number]  Patient Class: IP- Psych   Admission Date\Time: 11/12/2023  6:36 PM  Hospital Length of Stay: 1  Primary Care Provider: Myriam Nuñez MD     Referred by: Acute Psychiatry Unit Treatment Team     Target symptoms:      Patient's response to treatment: pt did not attend group     Progress toward goals:      Interval History:      Diagnosis:      Plan: Continue treatment on AP[facility]

## 2023-11-13 NOTE — NURSING
"Admission Note:    Ty Mello is a 28 y.o. male, : 1995, MRN: [id number], admitted on 2023 to [facility] ([facility]) for Damien Marshall MD with a diagnosis of Psychosis, unspecified psychosis type [F29]. Patient admitted on a status of Physician Emergency Certificate (PEC). Ty reports no known food or drug allergies.    Patient demonstrated an affect that was flat and anxious. Patient demonstrated mood during assessment that was depressed and anxious. Patient had an appearance that was disheveled.  Patient denies suicidal ideation. Patient denies suicide plan. Patient endorses hallucinations.    Caroles  height is 5' 4" (1.626 m) and weight is 116.3 kg (256 lb 6.3 oz). His temperature is 97.7 °F (36.5 °C). His blood pressure is 138/82 and his pulse is 68. His respiration is 18 and oxygen saturation is 100%.     Metal detector screening performed via security personnel. The result of the scan was negative. Head-to-toe physical assessment completed with the following findings:  No contraband  found upon body screen. A full skin assessment was performed. Caroles skin appeared intact wnl.  Ty was oriented to unit, staff, peers, and room. Patient belongings/valuables stored in locked intake room cabinet and changes of clothing provided to patient. Ty was placed on Q 15 min observations.      "

## 2023-11-13 NOTE — PLAN OF CARE
Problem: Adult Inpatient Plan of Care  Goal: Plan of Care Review  Outcome: Ongoing, Not Progressing  Goal: Patient-Specific Goal (Individualized)  Outcome: Ongoing, Not Progressing  Goal: Absence of Hospital-Acquired Illness or Injury  Outcome: Ongoing, Not Progressing  Goal: Optimal Comfort and Wellbeing  Outcome: Ongoing, Not Progressing  Goal: Readiness for Transition of Care  Outcome: Ongoing, Not Progressing     Problem: Behavior Regulation Impairment (Psychotic Signs/Symptoms)  Goal: Improved Behavioral Control (Psychotic Signs/Symptoms)  Outcome: Ongoing, Not Progressing     Problem: Cognitive Impairment (Psychotic Signs/Symptoms)  Goal: Optimal Cognitive Function (Psychotic Signs/Symptoms)  Outcome: Ongoing, Not Progressing     Problem: Decreased Participation and Engagement (Psychotic Signs/Symptoms)  Goal: Increased Participation and Engagement (Psychotic Signs/Symptoms)  Outcome: Ongoing, Not Progressing     Problem: Mood Impairment (Psychotic Signs/Symptoms)  Goal: Improved Mood Symptoms (Psychotic Signs/Symptoms)  Outcome: Ongoing, Not Progressing     Problem: Psychomotor Impairment (Psychotic Signs/Symptoms)  Goal: Improved Psychomotor Symptoms (Psychotic Signs/Symptoms)  Outcome: Ongoing, Not Progressing     Problem: Sensory Perception Impairment (Psychotic Signs/Symptoms)  Goal: Decreased Sensory Symptoms (Psychotic Signs/Symptoms)  Outcome: Ongoing, Not Progressing     Problem: Sleep Disturbance (Psychotic Signs/Symptoms)  Goal: Improved Sleep (Psychotic Signs/Symptoms)  Outcome: Ongoing, Not Progressing     Problem: Social, Occupational or Functional Impairment (Psychotic Signs/Symptoms)  Goal: Enhanced Social, Occupational or Functional Skills (Psychotic Signs/Symptoms)  Outcome: Ongoing, Not Progressing

## 2023-11-14 PROCEDURE — 25000003 PHARM REV CODE 250: Performed by: PEDIATRICS

## 2023-11-14 PROCEDURE — 25000003 PHARM REV CODE 250

## 2023-11-14 PROCEDURE — 11400000 HC PSYCH PRIVATE ROOM

## 2023-11-14 PROCEDURE — 25000003 PHARM REV CODE 250: Performed by: PSYCHIATRY & NEUROLOGY

## 2023-11-14 PROCEDURE — S4991 NICOTINE PATCH NONLEGEND: HCPCS | Performed by: PSYCHIATRY & NEUROLOGY

## 2023-11-14 RX ORDER — SERTRALINE HYDROCHLORIDE 50 MG/1
50 TABLET, FILM COATED ORAL DAILY
Status: DISCONTINUED | OUTPATIENT
Start: 2023-11-14 | End: 2023-11-15

## 2023-11-14 RX ADMIN — NICOTINE 1 PATCH: 21 PATCH, EXTENDED RELEASE TRANSDERMAL at 08:11

## 2023-11-14 RX ADMIN — SERTRALINE HYDROCHLORIDE 50 MG: 50 TABLET ORAL at 11:11

## 2023-11-14 RX ADMIN — LISINOPRIL 10 MG: 10 TABLET ORAL at 08:11

## 2023-11-14 NOTE — PROGRESS NOTES
"11/14/2023  Ty Mello   1995   [id number]        Psychiatry Progress Note     Chief Complaint: Im alright    SUBJECTIVE:   Ty Mello is a 28 y.o. male placed under a PEC at [facility] due to hallucinations and delusions.     Today patient states that he is not having any hallucinations. He is focused on discharge and is stating that he cannot stay her another two days. He was calm and cooperative during this exam and was redirectable. He does endorse continued depression and anxiety but states that "I just deal with it". He is amenable to the medication and I provided education on this.  He has no acute distress today. Will continue with current POC and monitor for need to augment.        Current Medications:   Scheduled Meds:    lisinopriL  10 mg Oral Daily    nicotine  1 patch Transdermal Daily    sertraline  50 mg Oral Daily      PRN Meds: acetaminophen, aluminum-magnesium hydroxide-simethicone, haloperidoL **AND** diphenhydrAMINE **AND** LORazepam **AND** haloperidol lactate **AND** diphenhydrAMINE **AND** lorazepam, hydrOXYzine HCL, traZODone   Psychotherapeutics (From admission, onward)      Start     Stop Route Frequency Ordered    11/14/23 0900  sertraline tablet 50 mg         -- Oral Daily 11/14/23 0857    11/12/23 1837  traZODone tablet 100 mg         -- Oral Nightly PRN 11/12/23 1837    11/12/23 1837  haloperidoL tablet 10 mg  (Med - Acute  Behavioral Management)        See Hyperspace for full Linked Orders Report.    -- Oral Every 4 hours PRN 11/12/23 1837    11/12/23 1837  LORazepam tablet 2 mg  (Med - Acute  Behavioral Management)        See Hyperspace for full Linked Orders Report.    -- Oral Every 4 hours PRN 11/12/23 1837    11/12/23 1837  haloperidol lactate injection 10 mg  (Med - Acute  Behavioral Management)        See Hyperspace for full Linked Orders Report.    -- IM Every 4 hours PRN 11/12/23 1837    11/12/23 1837  LORazepam injection 2 mg  (Med - Acute  Behavioral Management)        See [facility] for [facility].    -- IM Every 4 hours PRN 11/12/23 1837            Allergies:   Review of patient's allergies indicates:  No Known Allergies     OBJECTIVE:   Vitals   Vitals:    11/14/23 0701   BP: (!) 152/90   Pulse: 91   Resp: 18   Temp: 98.4 °F (36.9 °C)        Labs/Imaging/Studies:   Recent Results (from the past 36 hour(s))   CBC with Differential    Collection Time: 11/13/23  8:01 AM   Result Value Ref Range    WBC 6.06 4.50 - 11.50 x10(3)/mcL    RBC 5.54 4.70 - 6.10 x10(6)/mcL    Hgb 17.4 14.0 - 18.0 g/dL    Hct 51.7 42.0 - 52.0 %    MCV 93.3 80.0 - 94.0 fL    MCH 31.4 (H) 27.0 - 31.0 pg    MCHC 33.7 33.0 - 36.0 g/dL    RDW 13.7 11.5 - 17.0 %    Platelet 326 130 - 400 x10(3)/mcL    MPV 9.7 7.4 - 10.4 fL    Neut % 51.3 %    Lymph % 31.2 %    Mono % 13.4 %    Eos % 2.6 %    Basophil % 0.7 %    Lymph # 1.89 0.6 - 4.6 x10(3)/mcL    Neut # 3.11 2.1 - 9.2 x10(3)/mcL    Mono # 0.81 0.1 - 1.3 x10(3)/mcL    Eos # 0.16 0 - 0.9 x10(3)/mcL    Baso # 0.04 <=0.2 x10(3)/mcL    IG# 0.05 (H) 0 - 0.04 x10(3)/mcL    IG% 0.8 %    NRBC% 0.0 %   Lipid Panel    Collection Time: 11/13/23  8:01 AM   Result Value Ref Range    Cholesterol Total 144 <=200 mg/dL    HDL Cholesterol 26 (L) 35 - 60 mg/dL    Triglyceride 122 34 - 140 mg/dL    Cholesterol/HDL Ratio 6 (H) 0 - 5    Very Low Density Lipoprotein 24     LDL Cholesterol 94.00 50.00 - 140.00 mg/dL   SYPHILIS ANTIBODY (WITH REFLEX RPR)    Collection Time: 11/13/23  8:01 AM   Result Value Ref Range    Syphilis Antibody Nonreactive Nonreactive, Equivocal   Hemoglobin A1C    Collection Time: 11/13/23  8:01 AM   Result Value Ref Range    Hemoglobin A1c 5.0 <=7.0 %    Estimated Average Glucose 96.8 mg/dL          Medical Review Of Systems:  A comprehensive review of systems was negative.      Psychiatric Mental Status Exam:  General Appearance: appears stated age, well-developed, well-nourished  Arousal: alert  Behavior: cooperative  Movements and Motor  "Activity: no abnormal involuntary movements noted  Orientation: oriented to person, place, time, and situation  Speech: normal rate, normal rhythm, normal volume, normal tone  Mood: "Depressed"  Affect: constricted  Thought Process: linear  Associations: intact  Thought Content and Perceptions: no suicidal ideation, no homicidal ideation, denies auditory hallucinations, denies visual hallucinations, denies paranoid ideation, no ideas of reference  Recent and Remote Memory: recent memory intact, remote memory intact; per interview/observation with patient  Attention and Concentration: intact, attentive to conversation; per interview/observation with patient  Fund of Knowledge: intact, aware of current events, vocabulary appropriate; based on history, vocabulary, fund of knowledge, syntax, grammar, and content  Insight: questionable; based on understanding of severity of illness and HPI  Judgment: questionable; based on patient's behavior and HPI    ASSESSMENT/PLAN:   Problems Addressed/Diagnoses:  Unspecified Psychotic Disorder (F29)  Depression (F32.9)  Amphetamine use disorder (F15.20)  Cocaine use disorder (F14.10)    No past medical history on file.     Plan:  Psychosis, acute  -Will monitor     Depression, acute  -Zoloft 50mg daily     Amphetamine use, acute  -Group/Individual psychotherapy     Cocaine use, acute  -Group/Individual psychotherapy    Expected Disposition Plan: Home        Galen Torrez [facility]  "

## 2023-11-14 NOTE — PROGRESS NOTES
"Ty is a 28 male admitted for Unspecified Psychotic Disorder, Depression, Amphetamine use disorder, and Cocaine use disorder with a uds +amp and cocaine. CTRS met with Pt 1:1, Ty was guarded but cooperative, and reported ability to perform his ADL's despite being unkempt and was malodorous. CTRS educated Pt to TR group times and dates with Ty uncertain if he would attend, CTRS encourage Pt to attend groups as it is part of his treatment. Ty reported his treatment goal as "Clear my head out".     11/13/23 0938   General   Admit Date 11/12/23   Primary Diagnosis Unspecified Psychotic Disorder, Depression   Secondary Diagnosis Amphetamine use disorder, and Cocaine use disorder   Jainism spiritual   Number of Children 0   Children Living? 0   Occupation unemployed   Does the patient have dentures? No   If you were to take part in activities, which of the following would you prefer? Activities that you do alone   Do you feel like you have enough to keep you busy now? Yes   Do you believe that you have the opportunity for physical activity? Yes   Activity Capabilities Minimum   Subjective   Patient states I've been depressed and lost   Assessment   Mobility ambulates independently   Transfers independently   Musculoskeletal   (none)   Visual Acuity normal vision   Visual Perception depth perception;color perception;recognizes letters;recognizes numbers   Hearing normal   Speech/Communication normal   Cognitive Concerns disoriented to;situation   Emotional Concerns appears isolated;appears depressed   Leisure Interest Survey   Leisure Interest Survey Yes   Social/Group Activities   Restaurant Current Interest   Solitary Activities   Watching TV Current Interest   Computer Activities Current Interest   Music Listening Current Interest   Physical Activities   Baseball/Softball Current Interest   Billiards/Pool Current Interest   Tennis/Badminton Current Interest   Swimming Current Interest   Bowling Current " Interest   Volleyball Current Interest   Fitness/Exercise Programs Current Interest   Basketball Current Interest   Other Physical Activity   (football)   Creative Activities   Playing Musical Instru Current Interest   Photography Current Interest   Outdoor Activities   Hunting Current Interest   Picnics/Cookouts Current Interest   Bicycling Current Interest   Fishing Current Interest   Camping Current Interest   Skiing Current Interest   Water Sports Current Interest   Horseback Riding Current Interest   Hiking Current Interest   Spectator Events   Concerts Current Interest   Plays Current Interest   Movies Current Interest   Sporting Events Current Interest   Goals   Additional Documentation yes   Goal Formulation With patient   Time For Goal Achievement 7 days   Goal 1 Clear my head out   Goal 1-Progress ongoing- not progressing   Plan   Planned Therapy Intervention Group Recreational Therapy   Expected Length of Stay 5-7days   PT Frequency Minimum of 3 visits per week

## 2023-11-14 NOTE — NURSING
"Ty states today that he is "happily depressed" and  that "I just deal with it".He is not having any hallucinations. Today he is focused on discharge and the fact that he needs to find a girlfriend. Ty is calm and cooperative, follows instructions and is redirectable.  Appearance continues to be disheveled. He is amenable to the medication and reports no side effects. No acute distress reported today . Will continue with current POC and monitor for changes.    "

## 2023-11-14 NOTE — GROUP NOTE
Group Psychotherapy       Group Focus: Promoting Healthy Lifestyles        Number of patients in attendance: 6    Group focused on skill development and general goal of skills training. We reviewed specific behaviors to decrease and skills to increase to reduce problems in life and stress.     Group Start Time: 1045  Group End Time:  1115  Groups Date: 11/14/2023  Group Topic:  Behavioral Health  Group Department: [facility]  Group Facilitators:  Shayla Rodriguez SSW   _____________________________________________________________________    Patient Name: Ty Mello  MRN: [id number]  Patient Class: IP- Psych   Admission Date\Time: 11/12/2023  6:36 PM  Hospital Length of Stay: 2  Primary Care Provider: Myriam Nuñez MD     Referred by: Acute Psychiatry Unit Treatment Team     Target symptoms: Psychosis     Patient's response to treatment: pt did not attend group     Progress toward goals:      Interval History:      Diagnosis:      Plan: Continue treatment on APU

## 2023-11-15 PROBLEM — F29 PSYCHOTIC DISORDER: Status: ACTIVE | Noted: 2023-11-15

## 2023-11-15 PROBLEM — F14.10 COCAINE ABUSE, CONTINUOUS: Status: ACTIVE | Noted: 2023-11-15

## 2023-11-15 PROBLEM — F15.20 METHAMPHETAMINE ADDICTION: Status: ACTIVE | Noted: 2023-11-15

## 2023-11-15 PROBLEM — F32.9 MAJOR DEPRESSIVE DISORDER, SINGLE EPISODE, UNSPECIFIED: Status: ACTIVE | Noted: 2023-11-15

## 2023-11-15 PROCEDURE — 25000003 PHARM REV CODE 250

## 2023-11-15 PROCEDURE — 25000003 PHARM REV CODE 250: Performed by: PEDIATRICS

## 2023-11-15 PROCEDURE — 11400000 HC PSYCH PRIVATE ROOM

## 2023-11-15 RX ORDER — SERTRALINE HYDROCHLORIDE 100 MG/1
100 TABLET, FILM COATED ORAL DAILY
Qty: 30 TABLET | Refills: 0 | Status: SHIPPED | OUTPATIENT
Start: 2023-11-16 | End: 2023-11-15 | Stop reason: SDUPTHER

## 2023-11-15 RX ORDER — SERTRALINE HYDROCHLORIDE 50 MG/1
100 TABLET, FILM COATED ORAL DAILY
Status: DISCONTINUED | OUTPATIENT
Start: 2023-11-16 | End: 2023-11-16 | Stop reason: HOSPADM

## 2023-11-15 RX ORDER — SERTRALINE HYDROCHLORIDE 100 MG/1
100 TABLET, FILM COATED ORAL DAILY
Qty: 30 TABLET | Refills: 0 | Status: SHIPPED | OUTPATIENT
Start: 2023-11-16 | End: 2023-11-16 | Stop reason: SDUPTHER

## 2023-11-15 RX ADMIN — LISINOPRIL 10 MG: 10 TABLET ORAL at 09:11

## 2023-11-15 RX ADMIN — SERTRALINE HYDROCHLORIDE 50 MG: 50 TABLET ORAL at 09:11

## 2023-11-15 NOTE — PROGRESS NOTES
Ty refused both TR groups despite encouragement; Alternative material was offered   11/15/23 1500   U Group Therapy   Group Name Therapeutic Recreation   Specific Interventions Skilled Activity Leisure Education and Awareness   Participation Level None   Participation Quality Refused

## 2023-11-15 NOTE — CARE UPDATE
Treatment Team    Pt seem for treatment team today with interdisciplinary team.  Pt is Cooperative with Tx team. Pt denies symptoms at this time. MD did not change pt meds at this time. Treatment teams goals Not met at this time. Pt DC plan is home. DC date scheduled for 11/20/2023.    Pt stated that he was admitted due to headache and stress; admits to depression and he agrees to attend follow- up appointments.

## 2023-11-15 NOTE — NURSING
Pt is currently voicing no ADRs or physical complaints at this time, vital signs are stable, pt is not in any physical distress at the current time, currently voicing no suicidal or homicidal ideations at this time, pt reports depression because he does not have a girlfriend, pt does seem anxious, he reports he needs to get back to his job, he is avoiding discussing why he was sent here, which was severe delusions related to a snake that entered his body in the past and caused all kind of problems with his excretion system, currently voicing no detox symptoms at this time, will monitor with suicide prec., for anger,  Psychosis and detox symptoms and will be assisted prn.

## 2023-11-15 NOTE — PLAN OF CARE
"Ty refuses to attend TR groups despite encouragement, does not interact with peers, minimally with staff, and does not attend his ADL's AEB unkempt appearance and malodorous.    Ty attended treatment team, was minimizing reporting "All I needed was something for my headache and I was hot and tired, I ride my bike all day looking for a job", focused on discharge, and is not working towards treatment goals.  "

## 2023-11-15 NOTE — NURSING
"Daily Nursing Note:      Behavior:    Patient (Ty Mello is a 28 y.o. male, : 1995, MRN: [id number]) demonstrating an affect that was sad and flat. Ty demonstrating mood that is depressed. Ty had an appearance that was disheveled and poor hygiene. Ty denies suicidal ideation. Ty denies suicide plan. Ty denies homicidal ideation. Ty denies hallucinations.    Ty's  height is 5' 4" (1.626 m) and weight is 116.3 kg (256 lb 6.3 oz). His temperature is 97.7 °F (36.5 °C). His blood pressure is 136/87 and his pulse is 65. His respiration is 18 and oxygen saturation is 99%.       Intervention:    Encourage Ty to perform self-hygiene, grooming, and changing of clothing. Monitor Ty's behavior and program compliance. Monitor Ty for suicidal ideation, homicidal ideation, sleep disturbance, and hallucinations. Encourage Ty to eat all portions of meals and assess for meal preferences. Monitor Ty for intake and output to ensure hydration. Notify the Physician/Physician Assistant/Advance Practice Registered Nurse (MD/PA/APRN) for any medication refusal and any change in patient condition.      Response:    Ty verbalizes understand of unit process and procedures.      Plan:     Continue to monitor per MD/PA/APRN orders; maintain patient safety.   "

## 2023-11-15 NOTE — PROGRESS NOTES
"11/15/2023  Ty Mello   1995   [id number]        Psychiatry Progress Note     Chief Complaint: "I'm all right"    SUBJECTIVE:   Ty Mello is a 28 y.o. male placed under a PEC at [facility] due to hallucinations and delusions.     Denies any acute issues.  States that he is sleeping well.  Denies thoughts of self-harm or harm to others.  Tolerating current medication regimen without issues.     Poor participation in treatment/groups.  Minimizing the events that led to hospitalization.  Gets irritable when staff attempts to ask what we can do to prevent further hospitalizations.  Will titrate Zoloft and will monitor progress.     UDS: (+)cocaine, amphetamine  Blood alcohol: <10        Current Medications:   Scheduled Meds:    lisinopriL  10 mg Oral Daily    nicotine  1 patch Transdermal Daily    sertraline  50 mg Oral Daily      PRN Meds: acetaminophen, aluminum-magnesium hydroxide-simethicone, haloperidoL **AND** diphenhydrAMINE **AND** LORazepam **AND** haloperidol lactate **AND** diphenhydrAMINE **AND** lorazepam, hydrOXYzine HCL, traZODone   Psychotherapeutics (From admission, onward)      Start     Stop Route Frequency Ordered    11/14/23 0900  sertraline tablet 50 mg         -- Oral Daily 11/14/23 0857    11/12/23 1837  traZODone tablet 100 mg         -- Oral Nightly PRN 11/12/23 1837    11/12/23 1837  haloperidoL tablet 10 mg  (Med - Acute  Behavioral Management)        See Hyperspace for full Linked Orders Report.    -- Oral Every 4 hours PRN 11/12/23 1837    11/12/23 1837  LORazepam tablet 2 mg  (Med - Acute  Behavioral Management)        See Hyperspace for full Linked Orders Report.    -- Oral Every 4 hours PRN 11/12/23 1837    11/12/23 1837  haloperidol lactate injection 10 mg  (Med - Acute  Behavioral Management)        See Hyperspace for full Linked Orders Report.    -- IM Every 4 hours PRN 11/12/23 1837    11/12/23 1837  LORazepam injection 2 mg  (Med - Acute  Behavioral Management)        See " "Hyperspace for full Linked Orders Report.    -- IM Every 4 hours PRN 11/12/23 1837            Allergies:   Review of patient's allergies indicates:  No Known Allergies     OBJECTIVE:   Vitals   Vitals:    11/15/23 0907   BP: 127/82   Pulse:    Resp:    Temp:         Labs/Imaging/Studies:   No results found for this or any previous visit (from the past 36 hour(s)).       Medical Review Of Systems:  Constitutional: negative  Respiratory: negative  Cardiovascular: negative  Gastrointestinal: negative  Genitourinary:negative  Musculoskeletal:negative  Neurological: negative       Psychiatric Mental Status Exam:  General Appearance: appears stated age, well-developed, well-nourished  Arousal: alert  Behavior: cooperative  Movements and Motor Activity: no abnormal involuntary movements noted  Orientation: oriented to person, place, time, and situation  Speech: normal rate, normal rhythm, normal volume, normal tone  Mood: "All right"  Affect: constricted  Thought Process: linear  Associations: intact  Thought Content and Perceptions: denies suicidal ideation, no homicidal ideation, no auditory hallucinations, no visual hallucinations, no paranoid ideation, no ideas of reference  Recent and Remote Memory: recent memory intact, remote memory intact; per interview/observation with patient  Attention and Concentration: intact, attentive to conversation; per interview/observation with patient  Fund of Knowledge: intact, aware of current events, vocabulary appropriate; based on history, vocabulary, fund of knowledge, syntax, grammar, and content  Insight: questionable; based on understanding of severity of illness and HPI  Judgment: questionable; based on patient's behavior and HPI      ASSESSMENT/PLAN:   Problems Addressed/Diagnoses:  Unspecified Psychotic Disorder (F29)  Depression (F32.9)  Amphetamine use disorder (F15.20)  Cocaine use disorder (F14.10)    No past medical history on file.     Plan:  Psychosis, acute  -Will " monitor     Depression, acute  -Zoloft 50mg daily     Amphetamine use, acute  -Group/Individual psychotherapy     Cocaine use, acute  -Group/Individual psychotherapy    Expected Disposition Plan: Home        Damien Marshall M.D.

## 2023-11-16 VITALS
HEART RATE: 65 BPM | WEIGHT: 256.38 LBS | HEIGHT: 64 IN | DIASTOLIC BLOOD PRESSURE: 79 MMHG | OXYGEN SATURATION: 97 % | TEMPERATURE: 98 F | RESPIRATION RATE: 18 BRPM | BODY MASS INDEX: 43.77 KG/M2 | SYSTOLIC BLOOD PRESSURE: 137 MMHG

## 2023-11-16 PROCEDURE — 25000003 PHARM REV CODE 250: Performed by: PEDIATRICS

## 2023-11-16 PROCEDURE — 25000003 PHARM REV CODE 250: Performed by: PSYCHIATRY & NEUROLOGY

## 2023-11-16 RX ORDER — SERTRALINE HYDROCHLORIDE 100 MG/1
100 TABLET, FILM COATED ORAL DAILY
Qty: 30 TABLET | Refills: 0 | Status: SHIPPED | OUTPATIENT
Start: 2023-11-16 | End: 2023-12-16

## 2023-11-16 RX ORDER — LISINOPRIL 10 MG/1
10 TABLET ORAL DAILY
Qty: 30 TABLET | Refills: 0 | Status: SHIPPED | OUTPATIENT
Start: 2023-11-16 | End: 2023-12-16

## 2023-11-16 RX ORDER — IBUPROFEN 200 MG
1 TABLET ORAL DAILY
Qty: 28 PATCH | Refills: 0 | Status: SHIPPED | OUTPATIENT
Start: 2023-11-16 | End: 2023-12-14

## 2023-11-16 RX ADMIN — LISINOPRIL 10 MG: 10 TABLET ORAL at 08:11

## 2023-11-16 RX ADMIN — SERTRALINE HYDROCHLORIDE 100 MG: 50 TABLET ORAL at 08:11

## 2023-11-16 NOTE — PLAN OF CARE
Problem: Adult Inpatient Plan of Care  Goal: Plan of Care Review  11/15/2023 2128 by Clementina Claudio RN  Outcome: Met  11/15/2023 2128 by Clementina Claudio RN  Outcome: Ongoing, Progressing  Goal: Patient-Specific Goal (Individualized)  11/15/2023 2128 by Clementina Claudio RN  Outcome: Met  11/15/2023 2128 by Clementina Claudio RN  Outcome: Ongoing, Progressing  Goal: Absence of Hospital-Acquired Illness or Injury  11/15/2023 2128 by Clementina Claudio RN  Outcome: Met  11/15/2023 2128 by Clementina Claudio RN  Outcome: Ongoing, Progressing  Goal: Optimal Comfort and Wellbeing  11/15/2023 2128 by Clementina Claudio RN  Outcome: Met  11/15/2023 2128 by Clementina Claudio RN  Outcome: Ongoing, Progressing  Goal: Readiness for Transition of Care  11/15/2023 2128 by Clementina Claudio RN  Outcome: Met  11/15/2023 2128 by Clementina Claudio RN  Outcome: Ongoing, Progressing     Problem: Behavior Regulation Impairment (Psychotic Signs/Symptoms)  Goal: Improved Behavioral Control (Psychotic Signs/Symptoms)  11/15/2023 2128 by Clementina Claudio RN  Outcome: Met  11/15/2023 2128 by Clementina Claudio RN  Outcome: Ongoing, Progressing     Problem: Cognitive Impairment (Psychotic Signs/Symptoms)  Goal: Optimal Cognitive Function (Psychotic Signs/Symptoms)  11/15/2023 2128 by Clementina Claudio RN  Outcome: Met  11/15/2023 2128 by Clementina Claudio RN  Outcome: Ongoing, Progressing     Problem: Decreased Participation and Engagement (Psychotic Signs/Symptoms)  Goal: Increased Participation and Engagement (Psychotic Signs/Symptoms)  11/15/2023 2128 by Clementina Claudio RN  Outcome: Met  11/15/2023 2128 by Clementina Claudio RN  Outcome: Ongoing, Progressing     Problem: Mood Impairment (Psychotic Signs/Symptoms)  Goal: Improved Mood Symptoms (Psychotic Signs/Symptoms)  11/15/2023 2128 by Clementina Claudio RN  Outcome: Met  11/15/2023 2128 by Clementina Claudio RN  Outcome: Ongoing, Progressing     Problem: Psychomotor Impairment  (Psychotic Signs/Symptoms)  Goal: Improved Psychomotor Symptoms (Psychotic Signs/Symptoms)  11/15/2023 2128 by Clementina Claudio RN  Outcome: Met  11/15/2023 2128 by Clementina Claudio RN  Outcome: Ongoing, Progressing     Problem: Sensory Perception Impairment (Psychotic Signs/Symptoms)  Goal: Decreased Sensory Symptoms (Psychotic Signs/Symptoms)  11/15/2023 2128 by Clementina Claudio RN  Outcome: Met  11/15/2023 2128 by Clementina Claudio RN  Outcome: Ongoing, Progressing     Problem: Sleep Disturbance (Psychotic Signs/Symptoms)  Goal: Improved Sleep (Psychotic Signs/Symptoms)  11/15/2023 2128 by Clementina Claudio RN  Outcome: Met  11/15/2023 2128 by Clementina Claudio RN  Outcome: Ongoing, Progressing     Problem: Social, Occupational or Functional Impairment (Psychotic Signs/Symptoms)  Goal: Enhanced Social, Occupational or Functional Skills (Psychotic Signs/Symptoms)  11/15/2023 2128 by Clementina Claudio RN  Outcome: Met  11/15/2023 2128 by Clementina Claudio RN  Outcome: Ongoing, Progressing     Problem: Bariatric Environmental Safety  Goal: Safety Maintained with Care  11/15/2023 2128 by Clementina Claudio RN  Outcome: Met  11/15/2023 2128 by Clementina Claudio RN  Outcome: Ongoing, Progressing     Problem: Violence Risk or Actual  Goal: Anger and Impulse Control  11/15/2023 2128 by Clementina Claudio RN  Outcome: Met  11/15/2023 2128 by Clementina Claudio RN  Outcome: Ongoing, Progressing

## 2023-11-16 NOTE — NURSING
Discharge Note:    Ty Mello is a 28 y.o. male, : 1995, MRN: [id number], admitted on 2023 for Damien Marshall MD with a diagnosis of Psychosis, unspecified psychosis type [F29].    Patient discharged on 2023 per physician orders in stable condition. Patient denied suicidal ideation, homicidal ideation, or hallucinations. Patient was discharged with valuables, personal belongings, prescriptions, discharge instructions, and an educational handout explaining the diagnosis and prescribed medications. Patient verbalized understanding of the discharge instructions and importance of follow-up visits. Patient was escorted out of the facility by [facility] and placed into a secure transport vehicle to be transported to home.     Patient discharged on the following medications:     Medication List        START taking these medications      lisinopriL 10 MG tablet  Take 1 tablet (10 mg total) by mouth once daily.     nicotine 21 mg/24 hr  Commonly known as: NICODERM CQ  Place 1 patch onto the skin once daily.     sertraline 100 MG tablet  Commonly known as: ZOLOFT  Take 1 tablet (100 mg total) by mouth once daily.            STOP taking these medications      risperiDONE 1 MG tablet  Commonly known as: RISPERDAL               Where to Get Your Medications        You can get these medications from any pharmacy    Bring a paper prescription for each of these medications  lisinopriL 10 MG tablet  nicotine 21 mg/24 hr  sertraline 100 MG tablet

## 2023-11-16 NOTE — NURSING
"Daily Nursing Note:      Behavior:    Patient (Ty Mello is a 28 y.o. male, : 1995, MRN: [id number]) demonstrating an affect that was congruent. Ty demonstrating mood that is anxious. Ty had an appearance that was poor hygiene. Ty denies suicidal ideation. Ty denies suicide plan. Ty denies homicidal ideation. Ty denies hallucinations.    Ty's  height is 5' 4" (1.626 m) and weight is 116.3 kg (256 lb 6.3 oz). His temperature is 97.8 °F (36.6 °C). His blood pressure is 137/79 and his pulse is 65. His respiration is 18 and oxygen saturation is 97%.       Intervention:    Encourage Ty to perform self-hygiene, grooming, and changing of clothing. Monitor Ty's behavior and program compliance. Monitor Ty for suicidal ideation, homicidal ideation, sleep disturbance, and hallucinations. Encourage Ty to eat all portions of meals and assess for meal preferences. Monitor Ty for intake and output to ensure hydration. Notify the Physician/Physician Assistant/Advance Practice Registered Nurse (MD/PA/APRN) for any medication refusal and any change in patient condition.      Response:    Ty verbalizes understand of unit process and procedures.      Plan:     Continue to monitor per MD/PA/APRN orders; maintain patient safety.   "

## 2023-11-16 NOTE — DISCHARGE INSTRUCTIONS
Discharge instructions reviewed, explained and given to pt. Pt verbalizes understanding. Pt adequate for discharge.

## 2023-11-16 NOTE — PLAN OF CARE
Ty did not meet his goal of clear my head out but, met interdisciplinary treatment goal of Enhanced Social, Occupational or Functional Skills.  CTRS Discharge Recommendations:  Encouraged Pt. to actively utilize available community resources to increase leisure involvement to decrease signs and symptoms of illness.  Encouraged Pt. to utilize coping skills on a regular basis to reduce the risk of decomposition and re-hospitalization.

## 2023-11-17 NOTE — DISCHARGE SUMMARY
"DISCHARGE SUMMARY  PSYCHIATRY      Admit Date: 11/12/2023  6:36 PM    Discharge Date:  11/16/2023    SITE:   [facility]    Discharge Attending Physician: Damien Marshall M.D.    Chief Complaint:  "Life"  (on admit)    History of Present Illness On Admit:   Ty Mello is a 28 y.o. male placed under a PEC at [facility] due to hallucinations and delusions.     Patient had originally presented to the ED for headache.  Apparently, while there, he reported that his "face was melting off."  He had also reported to the ED that he "had a snake in his body 5 years ago."  He does report that he has been depressed due to "life."  Denies acute hallucinations.  States that he needs to get back home because he recently started a job.  Denies thoughts of self-harm or harm to others.  Will admit to inpatient unit and will monitor behavior and safety.     UDS: (+)cocaine, amphetamine  Blood alcohol: <10      Admit Mental Status Exam:  General Appearance: appears stated age, well-developed, well-nourished  Arousal: alert  Behavior: cooperative  Movements and Motor Activity: no abnormal involuntary movements noted  Orientation: oriented to person, place, time, and situation  Speech: normal rate, normal rhythm, normal volume, normal tone  Mood: "Depressed"  Affect: constricted  Thought Process: linear  Associations: intact  Thought Content and Perceptions: no suicidal ideation, no homicidal ideation, denies auditory hallucinations, denies visual hallucinations, denies paranoid ideation, no ideas of reference  Recent and Remote Memory: recent memory intact, remote memory intact; per interview/observation with patient  Attention and Concentration: intact, attentive to conversation; per interview/observation with patient  Fund of Knowledge: intact, aware of current events, vocabulary appropriate; based on history, vocabulary, fund of knowledge, syntax, grammar, and content  Insight: " "questionable; based on understanding of severity of illness and HPI  Judgment: questionable; based on patient's behavior and HPI      Diagnoses:  PRINCIPAL PROBLEM:  Psychotic disorder      PROBLEM LIST    Psychotic disorder    Major depressive disorder, single episode, unspecified    Methamphetamine addiction    Cocaine abuse, continuous        Hospital Course:   Patient was admitted to [facility] and started on Zoloft.    11/14/23  Today patient states that he is not having any hallucinations. He is focused on discharge and is stating that he cannot stay her another two days. He was calm and cooperative during this exam and was redirectable. He does endorse continued depression and anxiety but states that "I just deal with it". He is amenable to the medication and I provided education on this.  He has no acute distress today. Will continue with current POC and monitor for need to augment.       11/15/23  Denies any acute issues.  States that he is sleeping well.  Denies thoughts of self-harm or harm to others.  Tolerating current medication regimen without issues.      Poor participation in treatment/groups.  Minimizing the events that led to hospitalization.  Gets irritable when staff attempts to ask what we can do to prevent further hospitalizations.  Will titrate Zoloft and will monitor progress.        Current Medications:   Scheduled Meds:        DISCHARGE EXAMINATION    VITALS   Vitals:    11/15/23 0907 11/15/23 1100 11/15/23 1600 11/16/23 0701   BP: 127/82 129/77 137/79 Comment: refuse   Pulse:  75 65 Comment: refuse   Resp:   18 Comment: refyse   Temp:  98.5 °F (36.9 °C) 97.8 °F (36.6 °C) Comment: refuse   TempSrc:       SpO2:   97% Comment: refuserefuse   Weight:       Height:             Discharge Mental Status Exam:  General Appearance: appears stated age, well-developed, well-nourished  Arousal: alert  Behavior: cooperative  Movements and Motor Activity: no abnormal involuntary movements noted  Orientation: " "oriented to person, place, time, and situation  Speech: normal rate, normal rhythm, normal volume, normal tone  Mood: "All right"  Affect: constricted  Thought Process: linear  Associations: intact  Thought Content and Perceptions: denies suicidal ideation, no homicidal ideation, no auditory hallucinations, no visual hallucinations, no paranoid ideation, no ideas of reference  Recent and Remote Memory: recent memory intact, remote memory intact; per interview/observation with patient  Attention and Concentration: intact, attentive to conversation; per interview/observation with patient  Fund of Knowledge: intact, aware of current events, vocabulary appropriate; based on history, vocabulary, fund of knowledge, syntax, grammar, and content  Insight: questionable; based on understanding of severity of illness and HPI  Judgment: questionable; based on patient's behavior and HPI      Discharge Condition:  Stable    Prognosis:  Fair    Justification for >1 antipsychotic:  N/a    Disposition:  discharged to home    Follow-up:     Follow-up Information       [facility] Follow up.    Why: 11.21.2023 @9am  Bring insurance card and id  [facility] [id number]             Myriam Nuñez MD Follow up.    Specialty: Family Medicine  Contact information:  [address]  [place] [address]  [phone]                             Medication Regimen:  No current facility-administered medications for this encounter.    Current Outpatient Medications:     lisinopriL 10 MG tablet, Take 1 tablet (10 mg total) by mouth once daily., Disp: 30 tablet, Rfl: 0    nicotine (NICODERM CQ) 21 mg/24 hr, Place 1 patch onto the skin once daily., Disp: 28 patch, Rfl: 0    sertraline (ZOLOFT) 100 MG tablet, Take 1 tablet (100 mg total) by mouth once daily., Disp: 30 tablet, Rfl: 0      Patient Instructions:   Continue medication regimen as prescribed.    Disposition plan per "  - see  notes for details.    Patient instructed to call 911 or present to emergency department if any of the following complications develop status post discharge: suicidality, homicidality, or grave disability.     Total time spent discharging patient: <30 minutes      Damien Marshall M.D.

## 2023-12-28 ENCOUNTER — HOSPITAL ENCOUNTER (OUTPATIENT)
Facility: HOSPITAL | Age: 28
Discharge: PSYCHIATRIC OR SUBSTANCE USE FACILITY | End: 2024-01-01
Attending: STUDENT IN AN ORGANIZED HEALTH CARE EDUCATION/TRAINING PROGRAM | Admitting: INTERNAL MEDICINE
Payer: MEDICAID

## 2023-12-28 DIAGNOSIS — R40.4 UNRESPONSIVE EPISODE: ICD-10-CM

## 2023-12-28 DIAGNOSIS — E16.2 HYPOGLYCEMIA: Primary | ICD-10-CM

## 2023-12-28 DIAGNOSIS — R06.02 SOB (SHORTNESS OF BREATH): ICD-10-CM

## 2023-12-28 DIAGNOSIS — T50.904A OVERDOSE OF UNDETERMINED INTENT, INITIAL ENCOUNTER: ICD-10-CM

## 2023-12-28 LAB
ABS NEUT (OLG): 24.31 X10(3)/MCL (ref 2.1–9.2)
ALBUMIN SERPL-MCNC: 4.1 G/DL (ref 3.5–5)
ALBUMIN/GLOB SERPL: 1.4 RATIO (ref 1.1–2)
ALP SERPL-CCNC: 81 UNIT/L (ref 40–150)
ALT SERPL-CCNC: 84 UNIT/L (ref 0–55)
AMPHET UR QL SCN: POSITIVE
APAP SERPL-MCNC: <17.4 UG/ML (ref 17.4–30)
APPEARANCE UR: CLEAR
AST SERPL-CCNC: 87 UNIT/L (ref 5–34)
BACTERIA #/AREA URNS AUTO: ABNORMAL /HPF
BARBITURATE SCN PRESENT UR: NEGATIVE
BENZODIAZ UR QL SCN: NEGATIVE
BILIRUB SERPL-MCNC: 0.3 MG/DL
BILIRUB UR QL STRIP.AUTO: NEGATIVE
BNP BLD-MCNC: 36.5 PG/ML
BUN SERPL-MCNC: 16.6 MG/DL (ref 8.9–20.6)
CALCIUM SERPL-MCNC: 9.1 MG/DL (ref 8.4–10.2)
CANNABINOIDS UR QL SCN: POSITIVE
CHLORIDE SERPL-SCNC: 104 MMOL/L (ref 98–107)
CO2 SERPL-SCNC: 21 MMOL/L (ref 22–29)
COCAINE UR QL SCN: NEGATIVE
COLOR UR AUTO: YELLOW
CREAT SERPL-MCNC: 2.08 MG/DL (ref 0.73–1.18)
ERYTHROCYTE [DISTWIDTH] IN BLOOD BY AUTOMATED COUNT: 14.3 % (ref 11.5–17)
ETHANOL SERPL-MCNC: <10 MG/DL
FENTANYL UR QL SCN: POSITIVE
FLUAV AG UPPER RESP QL IA.RAPID: NOT DETECTED
FLUBV AG UPPER RESP QL IA.RAPID: NOT DETECTED
GFR SERPLBLD CREATININE-BSD FMLA CKD-EPI: 39 MLS/MIN/1.73/M2
GLOBULIN SER-MCNC: 2.9 GM/DL (ref 2.4–3.5)
GLUCOSE SERPL-MCNC: 42 MG/DL (ref 74–100)
GLUCOSE UR QL STRIP.AUTO: ABNORMAL
HCT VFR BLD AUTO: 53 % (ref 42–52)
HGB BLD-MCNC: 16.7 G/DL (ref 14–18)
INSTRUMENT WBC (OLG): 26.42 X10(3)/MCL
KETONES UR QL STRIP.AUTO: ABNORMAL
LACTATE SERPL-SCNC: 2.8 MMOL/L (ref 0.5–2.2)
LEUKOCYTE ESTERASE UR QL STRIP.AUTO: NEGATIVE
LYMPHOCYTES NFR BLD MANUAL: 1.06 X10(3)/MCL
LYMPHOCYTES NFR BLD MANUAL: 4 %
MACROCYTES BLD QL SMEAR: ABNORMAL
MAGNESIUM SERPL-MCNC: 2.4 MG/DL (ref 1.6–2.6)
MCH RBC QN AUTO: 30.9 PG (ref 27–31)
MCHC RBC AUTO-ENTMCNC: 31.5 G/DL (ref 33–36)
MCV RBC AUTO: 98 FL (ref 80–94)
MDMA UR QL SCN: NEGATIVE
METAMYELOCYTES NFR BLD MANUAL: 1 %
MONOCYTES NFR BLD MANUAL: 1.06 X10(3)/MCL (ref 0.1–1.3)
MONOCYTES NFR BLD MANUAL: 4 %
NEUTROPHILS NFR BLD MANUAL: 92 %
NITRITE UR QL STRIP.AUTO: NEGATIVE
NRBC BLD AUTO-RTO: 0 %
OPIATES UR QL SCN: NEGATIVE
PCP UR QL: NEGATIVE
PH UR STRIP.AUTO: 5.5 [PH]
PH UR: 5.5 [PH] (ref 3–11)
PLATELET # BLD AUTO: 337 X10(3)/MCL (ref 130–400)
PLATELET # BLD EST: NORMAL 10*3/UL
PMV BLD AUTO: 10.4 FL (ref 7.4–10.4)
POCT GLUCOSE: 103 MG/DL (ref 70–110)
POCT GLUCOSE: 176 MG/DL (ref 70–110)
POCT GLUCOSE: 68 MG/DL (ref 70–110)
POCT GLUCOSE: 86 MG/DL (ref 70–110)
POTASSIUM SERPL-SCNC: 4.6 MMOL/L (ref 3.5–5.1)
PROT SERPL-MCNC: 7 GM/DL (ref 6.4–8.3)
PROT UR QL STRIP.AUTO: ABNORMAL
RBC # BLD AUTO: 5.41 X10(6)/MCL (ref 4.7–6.1)
RBC #/AREA URNS AUTO: ABNORMAL /HPF
RBC MORPH BLD: ABNORMAL
RBC UR QL AUTO: NEGATIVE
SARS-COV-2 RNA RESP QL NAA+PROBE: NOT DETECTED
SODIUM SERPL-SCNC: 140 MMOL/L (ref 136–145)
SP GR UR STRIP.AUTO: 1.02 (ref 1–1.03)
SPECIFIC GRAVITY, URINE AUTO (.000) (OHS): 1.02 (ref 1–1.03)
SPERM URNS QL MICRO: ABNORMAL /HPF
SQUAMOUS #/AREA URNS LPF: ABNORMAL /HPF
TROPONIN I SERPL-MCNC: 0.08 NG/ML (ref 0–0.04)
TSH SERPL-ACNC: 1.55 UIU/ML (ref 0.35–4.94)
UROBILINOGEN UR STRIP-ACNC: NORMAL
WBC # SPEC AUTO: 26.42 X10(3)/MCL (ref 4.5–11.5)
WBC #/AREA URNS AUTO: ABNORMAL /HPF

## 2023-12-28 PROCEDURE — 63600175 PHARM REV CODE 636 W HCPCS: Performed by: STUDENT IN AN ORGANIZED HEALTH CARE EDUCATION/TRAINING PROGRAM

## 2023-12-28 PROCEDURE — 87040 BLOOD CULTURE FOR BACTERIA: CPT | Performed by: INTERNAL MEDICINE

## 2023-12-28 PROCEDURE — 80307 DRUG TEST PRSMV CHEM ANLYZR: CPT | Performed by: STUDENT IN AN ORGANIZED HEALTH CARE EDUCATION/TRAINING PROGRAM

## 2023-12-28 PROCEDURE — 99285 EMERGENCY DEPT VISIT HI MDM: CPT | Mod: 25

## 2023-12-28 PROCEDURE — 96361 HYDRATE IV INFUSION ADD-ON: CPT

## 2023-12-28 PROCEDURE — 96367 TX/PROPH/DG ADDL SEQ IV INF: CPT

## 2023-12-28 PROCEDURE — G0378 HOSPITAL OBSERVATION PER HR: HCPCS

## 2023-12-28 PROCEDURE — 82962 GLUCOSE BLOOD TEST: CPT | Mod: 91

## 2023-12-28 PROCEDURE — 84443 ASSAY THYROID STIM HORMONE: CPT | Performed by: STUDENT IN AN ORGANIZED HEALTH CARE EDUCATION/TRAINING PROGRAM

## 2023-12-28 PROCEDURE — 93010 ELECTROCARDIOGRAM REPORT: CPT | Mod: ,,, | Performed by: INTERNAL MEDICINE

## 2023-12-28 PROCEDURE — 83605 ASSAY OF LACTIC ACID: CPT | Mod: 91 | Performed by: STUDENT IN AN ORGANIZED HEALTH CARE EDUCATION/TRAINING PROGRAM

## 2023-12-28 PROCEDURE — 83880 ASSAY OF NATRIURETIC PEPTIDE: CPT | Performed by: STUDENT IN AN ORGANIZED HEALTH CARE EDUCATION/TRAINING PROGRAM

## 2023-12-28 PROCEDURE — 81001 URINALYSIS AUTO W/SCOPE: CPT | Mod: XB | Performed by: STUDENT IN AN ORGANIZED HEALTH CARE EDUCATION/TRAINING PROGRAM

## 2023-12-28 PROCEDURE — 82077 ASSAY SPEC XCP UR&BREATH IA: CPT | Performed by: STUDENT IN AN ORGANIZED HEALTH CARE EDUCATION/TRAINING PROGRAM

## 2023-12-28 PROCEDURE — 96375 TX/PRO/DX INJ NEW DRUG ADDON: CPT

## 2023-12-28 PROCEDURE — 82962 GLUCOSE BLOOD TEST: CPT

## 2023-12-28 PROCEDURE — 96366 THER/PROPH/DIAG IV INF ADDON: CPT

## 2023-12-28 PROCEDURE — 25000003 PHARM REV CODE 250: Performed by: STUDENT IN AN ORGANIZED HEALTH CARE EDUCATION/TRAINING PROGRAM

## 2023-12-28 PROCEDURE — 0240U COVID/FLU A&B PCR: CPT | Performed by: STUDENT IN AN ORGANIZED HEALTH CARE EDUCATION/TRAINING PROGRAM

## 2023-12-28 PROCEDURE — 96365 THER/PROPH/DIAG IV INF INIT: CPT

## 2023-12-28 PROCEDURE — 80053 COMPREHEN METABOLIC PANEL: CPT | Performed by: STUDENT IN AN ORGANIZED HEALTH CARE EDUCATION/TRAINING PROGRAM

## 2023-12-28 PROCEDURE — 84484 ASSAY OF TROPONIN QUANT: CPT | Performed by: STUDENT IN AN ORGANIZED HEALTH CARE EDUCATION/TRAINING PROGRAM

## 2023-12-28 PROCEDURE — 85007 BL SMEAR W/DIFF WBC COUNT: CPT | Performed by: STUDENT IN AN ORGANIZED HEALTH CARE EDUCATION/TRAINING PROGRAM

## 2023-12-28 PROCEDURE — 83735 ASSAY OF MAGNESIUM: CPT | Performed by: STUDENT IN AN ORGANIZED HEALTH CARE EDUCATION/TRAINING PROGRAM

## 2023-12-28 PROCEDURE — 85025 COMPLETE CBC W/AUTO DIFF WBC: CPT | Performed by: STUDENT IN AN ORGANIZED HEALTH CARE EDUCATION/TRAINING PROGRAM

## 2023-12-28 PROCEDURE — 93005 ELECTROCARDIOGRAM TRACING: CPT

## 2023-12-28 PROCEDURE — 80143 DRUG ASSAY ACETAMINOPHEN: CPT | Performed by: STUDENT IN AN ORGANIZED HEALTH CARE EDUCATION/TRAINING PROGRAM

## 2023-12-28 RX ORDER — DIPHENHYDRAMINE HYDROCHLORIDE 50 MG/ML
50 INJECTION, SOLUTION INTRAMUSCULAR; INTRAVENOUS EVERY 4 HOURS PRN
Status: DISCONTINUED | OUTPATIENT
Start: 2023-12-28 | End: 2024-01-01 | Stop reason: HOSPADM

## 2023-12-28 RX ORDER — DIPHENHYDRAMINE HCL 25 MG
50 CAPSULE ORAL EVERY 4 HOURS PRN
Status: DISCONTINUED | OUTPATIENT
Start: 2023-12-28 | End: 2024-01-01 | Stop reason: HOSPADM

## 2023-12-28 RX ORDER — LORAZEPAM 2 MG/ML
2 INJECTION INTRAMUSCULAR EVERY 4 HOURS PRN
Status: DISCONTINUED | OUTPATIENT
Start: 2023-12-28 | End: 2023-12-28

## 2023-12-28 RX ORDER — NALOXONE HCL 0.4 MG/ML
0.4 VIAL (ML) INJECTION
Status: COMPLETED | OUTPATIENT
Start: 2023-12-28 | End: 2023-12-28

## 2023-12-28 RX ORDER — HALOPERIDOL 5 MG/1
5 TABLET ORAL EVERY 4 HOURS PRN
Status: DISCONTINUED | OUTPATIENT
Start: 2023-12-28 | End: 2024-01-01 | Stop reason: HOSPADM

## 2023-12-28 RX ORDER — NALOXONE HCL 0.4 MG/ML
0.4 VIAL (ML) INJECTION
Status: DISCONTINUED | OUTPATIENT
Start: 2023-12-28 | End: 2024-01-01 | Stop reason: HOSPADM

## 2023-12-28 RX ORDER — HALOPERIDOL 5 MG/ML
5 INJECTION INTRAMUSCULAR EVERY 4 HOURS PRN
Status: DISCONTINUED | OUTPATIENT
Start: 2023-12-28 | End: 2024-01-01 | Stop reason: HOSPADM

## 2023-12-28 RX ORDER — ONDANSETRON 2 MG/ML
4 INJECTION INTRAMUSCULAR; INTRAVENOUS
Status: COMPLETED | OUTPATIENT
Start: 2023-12-28 | End: 2023-12-28

## 2023-12-28 RX ORDER — LORAZEPAM 1 MG/1
2 TABLET ORAL EVERY 4 HOURS PRN
Status: DISCONTINUED | OUTPATIENT
Start: 2023-12-28 | End: 2024-01-01 | Stop reason: HOSPADM

## 2023-12-28 RX ADMIN — ONDANSETRON 4 MG: 2 INJECTION INTRAMUSCULAR; INTRAVENOUS at 05:12

## 2023-12-28 RX ADMIN — VANCOMYCIN HYDROCHLORIDE 2000 MG: 500 INJECTION, POWDER, LYOPHILIZED, FOR SOLUTION INTRAVENOUS at 10:12

## 2023-12-28 RX ADMIN — PIPERACILLIN SODIUM AND TAZOBACTAM SODIUM 4.5 G: 4; .5 INJECTION, POWDER, FOR SOLUTION INTRAVENOUS at 09:12

## 2023-12-28 RX ADMIN — SODIUM CHLORIDE, POTASSIUM CHLORIDE, SODIUM LACTATE AND CALCIUM CHLORIDE 1000 ML: 600; 310; 30; 20 INJECTION, SOLUTION INTRAVENOUS at 04:12

## 2023-12-28 RX ADMIN — DEXTROSE MONOHYDRATE 500 ML: 100 INJECTION, SOLUTION INTRAVENOUS at 05:12

## 2023-12-28 RX ADMIN — NALOXONE HYDROCHLORIDE 0.4 MG: 0.4 INJECTION, SOLUTION INTRAMUSCULAR; INTRAVENOUS; SUBCUTANEOUS at 05:12

## 2023-12-28 RX ADMIN — DEXTROSE MONOHYDRATE 500 ML: 100 INJECTION, SOLUTION INTRAVENOUS at 08:12

## 2023-12-28 NOTE — ED PROVIDER NOTES
Encounter Date: 12/28/2023    SCRIBE #1 NOTE: I, Anh Onofre, am scribing for, and in the presence of,  Koko Anna MD. I have scribed the following portions of the note - Other sections scribed: HPI, ROS, PE.       History     Chief Complaint   Patient presents with    Drug Overdose     Found unresponsive at [facility]. Received 4 mg Narcan PTA. CBG 60, received 100 ml D10 in route via ems. GCS 9 on arrival     44 year old male presents to the ED via EMS after being found unresponsive at [facility] 4 today. When police arrived to the scene, they administered 2 of narcan, and EMS administered 2 more. His CBG was 60, received 100 ml D10 in route. The patient is not cooperative with exam and only reports that he is nauseous.      The history is provided by the patient and the EMS personnel. History limited by: uncooperative. No  was used.     Review of patient's allergies indicates:  Not on File  No past medical history on file.  No past surgical history on file.  No family history on file.     Review of Systems   Gastrointestinal:  Positive for nausea.       Physical Exam     Initial Vitals [12/28/23 1432]   BP Pulse Resp Temp SpO2   (!) 136/96 110 20 99.3 °F (37.4 °C) 98 %      MAP       --         Physical Exam    Vitals reviewed.  Constitutional: He appears well-developed and well-nourished. He is not diaphoretic. No distress.   HENT:   Head: Normocephalic and atraumatic.   Nose: Nose normal.   Eyes: EOM are normal. Pupils are equal, round, and reactive to light. Right eye exhibits no discharge. Left eye exhibits no discharge.   Cardiovascular:  Normal rate, regular rhythm and normal heart sounds.     Exam reveals no gallop and no friction rub.       No murmur heard.  Pulses:       Radial pulses are 2+ on the right side and 2+ on the left side.   Pulmonary/Chest: Effort normal and breath sounds normal. No respiratory distress. He has no wheezes. He has no rhonchi. He has no rales. He exhibits no  tenderness.   Abdominal: Abdomen is soft. Bowel sounds are normal. He exhibits no distension and no mass. There is no abdominal tenderness. There is no rebound and no guarding.   Musculoskeletal:         General: No edema. Normal range of motion.     Neurological:   Drowsy, but arousable; uncooperative with exam    Skin: Skin is warm and dry. Capillary refill takes less than 2 seconds.         ED Course   Procedures  Labs Reviewed   COMPREHENSIVE METABOLIC PANEL - Abnormal; Notable for the following components:       Result Value    Carbon Dioxide 21 (*)     Glucose Level 42 (*)     Creatinine 2.08 (*)     Alanine Aminotransferase 84 (*)     Aspartate Aminotransferase 87 (*)     All other components within normal limits   TROPONIN I - Abnormal; Notable for the following components:    Troponin-I 0.076 (*)     All other components within normal limits   URINALYSIS, REFLEX TO URINE CULTURE - Abnormal; Notable for the following components:    Protein, UA 1+ (*)     Glucose, UA 1+ (*)     Ketones, UA 1+ (*)     Sperm, UA Few (*)     All other components within normal limits   DRUG SCREEN, URINE (BEAKER) - Abnormal; Notable for the following components:    Amphetamines, Urine Positive (*)     Cannabinoids, Urine Positive (*)     Fentanyl, Urine Positive (*)     All other components within normal limits    Narrative:     Cut off concentrations:    Amphetamines - 1000 ng/ml  Barbiturates - 200 ng/ml  Benzodiazepine - 200 ng/ml  Cannabinoids (THC) - 50 ng/ml  Cocaine - 300 ng/ml  Fentanyl - 1.0 ng/ml  MDMA - 500 ng/ml  Opiates - 300 ng/ml   Phencyclidine (PCP) - 25 ng/ml    Specimen submitted for drug analysis and tested for pH and specific gravity in order to evaluate sample integrity. Suspect tampering if specific gravity is <1.003 and/or pH is not within the range of 4.5 - 8.0  False negatives may result form substances such as bleach added to urine.  False positives may result for the presence of a substance with  similar chemical structure to the drug or its metabolite.    This test provides only a PRELIMINARY analytical test result. A more specific alternate chemical method must be used in order to obtain a confirmed analytical result. Gas chromatography/mass spectrometry (GC/MS) is the preferred confirmatory method. Other chemical confirmation methods are available. Clinical consideration and professional judgement should be applied to any drug of abuse test result, particularly when preliminary positive results are used.    Positive results will be confirmed only at the physicians request. Unconfirmed screening results are to be used only for medical purposes (treatment).        CBC WITH DIFFERENTIAL - Abnormal; Notable for the following components:    WBC 26.42 (*)     Hct 53.0 (*)     MCV 98.0 (*)     MCHC 31.5 (*)     All other components within normal limits   MANUAL DIFFERENTIAL - Abnormal; Notable for the following components:    Metamyelocytes % 1 (*)     Neutrophils Abs 24.3064 (*)     RBC Morph Abnormal (*)     Macrocytosis 1+ (*)     All other components within normal limits   LACTIC ACID, PLASMA - Abnormal; Notable for the following components:    Lactic Acid Level 2.8 (*)     All other components within normal limits   ACETAMINOPHEN LEVEL - Abnormal; Notable for the following components:    Acetaminophen Level <17.4 (*)     All other components within normal limits   POCT GLUCOSE - Abnormal; Notable for the following components:    POCT Glucose 176 (*)     All other components within normal limits   POCT GLUCOSE - Abnormal; Notable for the following components:    POCT Glucose 68 (*)     All other components within normal limits   COVID/FLU A&B PCR - Normal    Narrative:     The Xpert Xpress SARS-CoV-2/FLU/RSV plus is a rapid, multiplexed real-time PCR test intended for the simultaneous qualitative detection and differentiation of SARS-CoV-2, Influenza A, Influenza B, and respiratory syncytial virus (RSV)  viral RNA in either nasopharyngeal swab or nasal swab specimens.         MAGNESIUM - Normal   TSH - Normal   B-TYPE NATRIURETIC PEPTIDE - Normal   ALCOHOL,MEDICAL (ETHANOL) - Normal   BLOOD CULTURE [facility]   BLOOD CULTURE [facility]   CBC W/ AUTO DIFFERENTIAL    Narrative:     The following orders were created for panel order CBC auto differential.  Procedure                               Abnormality         Status                     ---------                               -----------         ------                     CBC with Differential[[id number]]       Abnormal            Final result               Manual Differential[[id number]]         Abnormal            Final result                 Please view results for these tests on the individual orders.   SARS CORONAVIRUS 2 ANTIGEN POCT, MANUAL READ   POCT GLUCOSE   POCT GLUCOSE        ECG Results              EKG 12-lead (Final result)  Result time 12/28/23 18:27:50      Final result by Interface, Lab In [facility] (12/28/23 18:27:50)                   Narrative:    Test Reason : R40.4,    Vent. Rate : 101 BPM     Atrial Rate : 101 BPM     P-R Int : 150 ms          QRS Dur : 092 ms      QT Int : 344 ms       P-R-T Axes : 075 077 054 degrees     QTc Int : 446 ms    Sinus tachycardia  Otherwise normal ECG  No previous ECGs available  Confirmed by Doug COREY, Arvind ([id number]) on 12/28/2023 6:27:41 PM    Referred By:             Confirmed By:Arvind Camacho MD                                  Imaging Results              X-Ray Chest AP Portable (Final result)  Result time 12/28/23 16:11:41      Final result by Henrique Hawley MD (12/28/23 16:11:41)                   Impression:      Findings suggest pulmonary edema.      Electronically signed by: Henrique Hawley  Date:    12/28/2023  Time:    16:11               Narrative:    EXAMINATION:  XR CHEST AP PORTABLE    CLINICAL HISTORY:  unresponsive episode;    TECHNIQUE:  One view    COMPARISON:  None available.    FINDINGS:  Cardiopericardial  silhouette is within normal limits.  Bilateral lungs are remarkable for ground-glass and reticulonodular opacities mostly about the central location and suggest moderate pulmonary edema.  No focally dense consolidation.  No significant fluid within the pleural spaces.  No pneumothorax.                                       CT Head Without Contrast (Final result)  Result time 12/28/23 15:56:58      Final result by Giovanny Acevedo MD (12/28/23 15:56:58)                   Narrative:    EXAMINATION  CT HEAD WITHOUT CONTRAST    CLINICAL HISTORY  Mental status change, unknown cause;    TECHNIQUE  Axial non-contrast CT images of the head were acquired and multiplanar reconstructions accomplished by a CT technologist at a separate workstation, pushed to PACS for physician review.    COMPARISON  None available at the time of the initial interpretation.    FINDINGS  Images were reviewed in subdural, brain, soft tissue, and bone windows.    Exam quality: Motion/streak artifact limits assessment of the posterior fossa.    Hemorrhage: No evidence of acute hyperattenuating blood products.    Parenchyma: No discrete mass, localized mass-effect, or CT evidence of an acute territorial cortical-based ischemic insult. Gray-white differentiation is preserved.    Midline shift: None.    CSF spaces: Normal ventricle size and configuration. No extra-axial masses or expansile fluid collections.    Vasculature: No hyperdense artery identified. No abnormal densities within the dural sinuses.    Other findings: No abnormalities of the scalp or subjacent osseous structures. Mastoids are well aerated. No focal abnormality of the sella. The included facial structures are unremarkable.    IMPRESSION  No CT-evident acute intracranial abnormality.    RADIATION DOSE  Automated tube current modulation, weight-based exposure dosing, and/or iterative reconstruction technique utilized to reach lowest reasonably achievable exposure rate.    DLP: 2353  mGy*cm      Electronically signed by: Giovanny Acevedo  Date:    12/28/2023  Time:    15:56                                     Medications   haloperidoL tablet 5 mg (has no administration in time range)   LORazepam tablet 2 mg (has no administration in time range)   diphenhydrAMINE capsule 50 mg (has no administration in time range)   haloperidol lactate injection 5 mg (has no administration in time range)   diphenhydrAMINE injection 50 mg (has no administration in time range)   naloxone 0.4 mg/mL injection 0.4 mg (has no administration in time range)   vancomycin - pharmacy to dose (has no administration in time range)   piperacillin-tazobactam (ZOSYN) 4.5 g in dextrose 5 % in water (D5W) 100 mL IVPB (MB+) (has no administration in time range)   naloxone 0.4 mg/mL injection 0.4 mg (0.4 mg Intravenous Given 12/28/23 1700)   ondansetron injection 4 mg (4 mg Intravenous Given 12/28/23 1700)   lactated ringers bolus 1,000 mL (0 mLs Intravenous Stopped 12/28/23 1700)   dextrose 10% bolus 500 mL 500 mL (0 mLs Intravenous Stopped 12/28/23 1806)   dextrose 10% bolus 500 mL 500 mL (0 mLs Intravenous Stopped 12/28/23 2100)   piperacillin-tazobactam (ZOSYN) 4.5 g in dextrose 5 % in water (D5W) 100 mL IVPB (MB+) (0 g Intravenous Stopped 12/28/23 2225)   vancomycin 2 g in dextrose 5 % 500 mL IVPB (0 mg Intravenous Stopped 12/29/23 0041)     Medical Decision Making      The differential diagnosis includes, but is not limited to, suicidal ideations, homicidal ideations, auditory or visual hallucinations, drug/etoh intoxication, bipolar disorder, schizophrenia, schizoaffective, delusional disorder, major depressive disorder, PTSD, substance induced mood disorder, violent behavior, suicidal attempt, non-psychiatric medical illness, malingering      Patient is drowsy but arousable.  Not very cooperative with the exam.  I believe that he is minimizing symptoms and/or voiding answering questions such as what happened today hernia up in  the hospital.  Denies any prior memory earlier today.  Evidently he was found down requiring Narcan.  Also found to be hypoglycemic.  He was has repeat bouts of hypoglycemia here in the emergency department.  His required 2 boluses of D10.  He will require admission for closer monitoring.  Also has a mildly elevated troponin.  Patient was have a positive psychiatric history with concern for his well being concerned this may have represented a suicide attempt he is placed under physician emergency certificate.  He will be admitted for further evaluation monitoring.  Care transferred.    Amount and/or Complexity of Data Reviewed  Independent Historian: EMS     Details: Per EMS: 44 year old male found unresponsive at [facility] today. When police arrived to the scene, they administered 2 of narcan, and EMS administered 2 more. His CBG was 60, received 100 ml D10 in route  External Data Reviewed: notes.     Details: See ED course  Labs: ordered. Decision-making details documented in ED Course.  Radiology: ordered. Decision-making details documented in ED Course.    Risk  OTC drugs.  Prescription drug management.            Scribe Attestation:   Scribe #1: I performed the above scribed service and the documentation accurately describes the services I performed. I attest to the accuracy of the note.    Attending Attestation:           Physician Attestation for Scribe:  Physician Attestation Statement for Scribe #1: I, Koko Anna MD, reviewed documentation, as scribed by Anh Onofre in my presence, and it is both accurate and complete.             ED Course as of 12/29/23 0054   u Dec 28, 2023   1653 Paged radiologist  [MM]   1714 Magnesium : 2.40 [MM-2]   1714 Influenza A, Molecular: Not Detected [MM-2]   1714 Influenza B, Molecular: Not Detected [MM-2]   1714 SARS-CoV2 (COVID-19) Qualitative PCR: Not Detected [MM-2]   1714 Troponin I(!): 0.076 [MM-2]   1729 EKG from 07/17 shows sinus tachycardia rate of 101 QTC  446.  [MM-2]   1825 Spoke to [facility].  Will follow along. [MM-2]   1906 Lactate, Michael(!): 2.8 [MM-2]   1906 POCT Glucose(!): 176 [MM-2]   1906 WBC(!): 26.42 [MM-2]   2011 POCT Glucose(!): 68 [MM-2]   2018 Chart review reveals discharge summary from psychiatric hospital from 11/16/2023.  Patient had been admitted on the 12th for hallucinations and delusions.  At that time for care plate of depression.  UDS positive for cocaine and amphetamines.  He was started on Zoloft.  Apparently that time while inpatient had poor participation in treatment groups.  Minimizing the events that led to hospitalization.  Was evidently very irritable started on Zoloft.  Social work was involved.  Ultimately was discharged home. [MM-2]   2018 Patient very cooperative here in the emergency department.  Is not always answering questions.  I am concerned that possibly his what appears to be an opioid overdose earlier today was an attempt to harm himself.  Does have a psychiatric history.  Also has persistent hypoglycemia here in the emergency department.  Once again unsure why.  Patient will have to be admitted for further monitoring of his glucose.  He was placed under pec for protection of himself.  Concerned that he was gravely disabled. [MM-2]      ED Course User Index  [MM] Anh Onofre  [MM-2] Koko Anna MD                           Clinical Impression:  Final diagnoses:  [R40.4] Unresponsive episode  [E16.2] Hypoglycemia (Primary)  [T50.904A] Overdose of undetermined intent, initial encounter          ED Disposition Condition    Observation Stable                Koko Anna MD  12/29/23 0054

## 2023-12-28 NOTE — Clinical Note
Diagnosis: Unresponsive episode [174376]   Future Attending Provider: CELESTINA HANSON [[id number]]   Admit to which facility:: [facility] [[id number]]   Admitting Provider:: CELESTINA HANSON [[id number]]

## 2023-12-29 PROBLEM — R40.4 UNRESPONSIVE EPISODE: Status: ACTIVE | Noted: 2023-12-29

## 2023-12-29 LAB
ABS NEUT (OLG): 25.22 X10(3)/MCL (ref 2.1–9.2)
ALBUMIN SERPL-MCNC: 3.5 G/DL (ref 3.5–5)
ALBUMIN/GLOB SERPL: 1.5 RATIO (ref 1.1–2)
ALP SERPL-CCNC: 59 UNIT/L (ref 40–150)
ALT SERPL-CCNC: 59 UNIT/L (ref 0–55)
AST SERPL-CCNC: 41 UNIT/L (ref 5–34)
AV INDEX (PROSTH): 0.67
AV MEAN GRADIENT: 6 MMHG
AV PEAK GRADIENT: 12 MMHG
AV VELOCITY RATIO: 0.78
B PERT.PT PRMT NPH QL NAA+NON-PROBE: NOT DETECTED
BILIRUB SERPL-MCNC: 0.6 MG/DL
BSA FOR ECHO PROCEDURE: 2.32 M2
BUN SERPL-MCNC: 13.5 MG/DL (ref 8.9–20.6)
C PNEUM DNA NPH QL NAA+NON-PROBE: NOT DETECTED
CALCIUM SERPL-MCNC: 8.6 MG/DL (ref 8.4–10.2)
CHLORIDE SERPL-SCNC: 103 MMOL/L (ref 98–107)
CK SERPL-CCNC: 359 U/L (ref 30–200)
CO2 SERPL-SCNC: 21 MMOL/L (ref 22–29)
CREAT SERPL-MCNC: 1.33 MG/DL (ref 0.73–1.18)
CV ECHO LV RWT: 0.43 CM
DOP CALC AO PEAK VEL: 1.71 M/S
DOP CALC AO VTI: 32.7 CM
DOP CALC LVOT PEAK VEL: 1.33 M/S
DOP CALCLVOT PEAK VEL VTI: 21.8 CM
E WAVE DECELERATION TIME: 193 MSEC
E/A RATIO: 1.28
E/E' RATIO: 7.03 M/S
ECHO LV POSTERIOR WALL: 1.11 CM (ref 0.6–1.1)
ERYTHROCYTE [DISTWIDTH] IN BLOOD BY AUTOMATED COUNT: 14.1 % (ref 11.5–17)
FRACTIONAL SHORTENING: 50 % (ref 28–44)
GFR SERPLBLD CREATININE-BSD FMLA CKD-EPI: >60 MLS/MIN/1.73/M2
GLOBULIN SER-MCNC: 2.4 GM/DL (ref 2.4–3.5)
GLUCOSE SERPL-MCNC: 73 MG/DL (ref 74–100)
HADV DNA NPH QL NAA+NON-PROBE: NOT DETECTED
HAV IGM SERPL QL IA: NONREACTIVE
HBV CORE IGM SERPL QL IA: NONREACTIVE
HBV SURFACE AG SERPL QL IA: NONREACTIVE
HCOV 229E RNA NPH QL NAA+NON-PROBE: NOT DETECTED
HCOV HKU1 RNA NPH QL NAA+NON-PROBE: NOT DETECTED
HCOV NL63 RNA NPH QL NAA+NON-PROBE: NOT DETECTED
HCOV OC43 RNA NPH QL NAA+NON-PROBE: NOT DETECTED
HCT VFR BLD AUTO: 43.2 % (ref 42–52)
HCV AB SERPL QL IA: NONREACTIVE
HGB BLD-MCNC: 14 G/DL (ref 14–18)
HIV 1+2 AB+HIV1 P24 AG SERPL QL IA: NONREACTIVE
HMPV RNA NPH QL NAA+NON-PROBE: NOT DETECTED
HPIV1 RNA NPH QL NAA+NON-PROBE: NOT DETECTED
HPIV2 RNA NPH QL NAA+NON-PROBE: NOT DETECTED
HPIV3 RNA NPH QL NAA+NON-PROBE: NOT DETECTED
HPIV4 RNA NPH QL NAA+NON-PROBE: NOT DETECTED
INSTRUMENT WBC (OLG): 26.83 X10(3)/MCL
INTERVENTRICULAR SEPTUM: 1.09 CM (ref 0.6–1.1)
LACTATE SERPL-SCNC: 1.8 MMOL/L (ref 0.5–2.2)
LEFT ATRIUM SIZE: 3.4 CM
LEFT ATRIUM VOLUME INDEX MOD: 19.1 ML/M2
LEFT ATRIUM VOLUME MOD: 42.7 CM3
LEFT INTERNAL DIMENSION IN SYSTOLE: 2.61 CM (ref 2.1–4)
LEFT VENTRICLE DIASTOLIC VOLUME INDEX: 57.14 ML/M2
LEFT VENTRICLE DIASTOLIC VOLUME: 128 ML
LEFT VENTRICLE MASS INDEX: 98 G/M2
LEFT VENTRICLE SYSTOLIC VOLUME INDEX: 11.1 ML/M2
LEFT VENTRICLE SYSTOLIC VOLUME: 24.8 ML
LEFT VENTRICULAR INTERNAL DIMENSION IN DIASTOLE: 5.17 CM (ref 3.5–6)
LEFT VENTRICULAR MASS: 218.69 G
LV LATERAL E/E' RATIO: 5.67 M/S
LV SEPTAL E/E' RATIO: 9.27 M/S
LVOT MG: 4 MMHG
LVOT MV: 0.91 CM/S
LYMPHOCYTES NFR BLD MANUAL: 0.8 X10(3)/MCL
LYMPHOCYTES NFR BLD MANUAL: 3 %
M PNEUMO DNA NPH QL NAA+NON-PROBE: NOT DETECTED
MCH RBC QN AUTO: 30.6 PG (ref 27–31)
MCHC RBC AUTO-ENTMCNC: 32.4 G/DL (ref 33–36)
MCV RBC AUTO: 94.5 FL (ref 80–94)
MONOCYTES NFR BLD MANUAL: 0.8 X10(3)/MCL (ref 0.1–1.3)
MONOCYTES NFR BLD MANUAL: 3 %
MV PEAK A VEL: 0.8 M/S
MV PEAK E VEL: 1.02 M/S
NEUTROPHILS NFR BLD MANUAL: 94 %
NRBC BLD AUTO-RTO: 0 %
OHS LV EJECTION FRACTION SIMPSONS BIPLANE MOD: 51 %
PLATELET # BLD AUTO: 300 X10(3)/MCL (ref 130–400)
PLATELET # BLD EST: NORMAL 10*3/UL
PMV BLD AUTO: 10.3 FL (ref 7.4–10.4)
POCT GLUCOSE: 86 MG/DL (ref 70–110)
POCT GLUCOSE: 91 MG/DL (ref 70–110)
POTASSIUM SERPL-SCNC: 4.4 MMOL/L (ref 3.5–5.1)
PROT SERPL-MCNC: 5.9 GM/DL (ref 6.4–8.3)
PV PEAK GRADIENT: 9 MMHG
PV PEAK VELOCITY: 1.51 M/S
RBC # BLD AUTO: 4.57 X10(6)/MCL (ref 4.7–6.1)
RBC MORPH BLD: NORMAL
RIGHT VENTRICULAR END-DIASTOLIC DIMENSION: 3.9 CM
RSV RNA NPH QL NAA+NON-PROBE: NOT DETECTED
RV+EV RNA NPH QL NAA+NON-PROBE: DETECTED
SODIUM SERPL-SCNC: 136 MMOL/L (ref 136–145)
TDI LATERAL: 0.18 M/S
TDI SEPTAL: 0.11 M/S
TDI: 0.15 M/S
TRICUSPID ANNULAR PLANE SYSTOLIC EXCURSION: 1.8 CM
TROPONIN I SERPL-MCNC: 0.42 NG/ML (ref 0–0.04)
TROPONIN I SERPL-MCNC: 0.44 NG/ML (ref 0–0.04)
VANCOMYCIN SERPL-MCNC: 11 UG/ML (ref 15–20)
WBC # SPEC AUTO: 26.83 X10(3)/MCL (ref 4.5–11.5)
Z-SCORE OF LEFT VENTRICULAR DIMENSION IN END DIASTOLE: -4.38
Z-SCORE OF LEFT VENTRICULAR DIMENSION IN END SYSTOLE: -4.91

## 2023-12-29 PROCEDURE — 82550 ASSAY OF CK (CPK): CPT | Performed by: INTERNAL MEDICINE

## 2023-12-29 PROCEDURE — 99900035 HC TECH TIME PER 15 MIN (STAT)

## 2023-12-29 PROCEDURE — 80202 ASSAY OF VANCOMYCIN: CPT | Performed by: STUDENT IN AN ORGANIZED HEALTH CARE EDUCATION/TRAINING PROGRAM

## 2023-12-29 PROCEDURE — 96372 THER/PROPH/DIAG INJ SC/IM: CPT | Performed by: INTERNAL MEDICINE

## 2023-12-29 PROCEDURE — 96361 HYDRATE IV INFUSION ADD-ON: CPT

## 2023-12-29 PROCEDURE — 63600175 PHARM REV CODE 636 W HCPCS: Performed by: INTERNAL MEDICINE

## 2023-12-29 PROCEDURE — 85027 COMPLETE CBC AUTOMATED: CPT | Performed by: INTERNAL MEDICINE

## 2023-12-29 PROCEDURE — 94761 N-INVAS EAR/PLS OXIMETRY MLT: CPT

## 2023-12-29 PROCEDURE — 96366 THER/PROPH/DIAG IV INF ADDON: CPT

## 2023-12-29 PROCEDURE — G0378 HOSPITAL OBSERVATION PER HR: HCPCS

## 2023-12-29 PROCEDURE — 80053 COMPREHEN METABOLIC PANEL: CPT | Performed by: INTERNAL MEDICINE

## 2023-12-29 PROCEDURE — 87798 DETECT AGENT NOS DNA AMP: CPT | Performed by: INTERNAL MEDICINE

## 2023-12-29 PROCEDURE — 80074 ACUTE HEPATITIS PANEL: CPT | Performed by: INTERNAL MEDICINE

## 2023-12-29 PROCEDURE — 84484 ASSAY OF TROPONIN QUANT: CPT | Mod: 91 | Performed by: INTERNAL MEDICINE

## 2023-12-29 PROCEDURE — 25000003 PHARM REV CODE 250: Performed by: INTERNAL MEDICINE

## 2023-12-29 PROCEDURE — 94640 AIRWAY INHALATION TREATMENT: CPT

## 2023-12-29 PROCEDURE — 25000242 PHARM REV CODE 250 ALT 637 W/ HCPCS: Performed by: INTERNAL MEDICINE

## 2023-12-29 PROCEDURE — 87040 BLOOD CULTURE FOR BACTERIA: CPT | Performed by: INTERNAL MEDICINE

## 2023-12-29 PROCEDURE — 87389 HIV-1 AG W/HIV-1&-2 AB AG IA: CPT | Performed by: INTERNAL MEDICINE

## 2023-12-29 RX ORDER — IBUPROFEN 200 MG
16 TABLET ORAL
Status: DISCONTINUED | OUTPATIENT
Start: 2023-12-29 | End: 2024-01-01 | Stop reason: HOSPADM

## 2023-12-29 RX ORDER — SODIUM CHLORIDE 0.9 % (FLUSH) 0.9 %
10 SYRINGE (ML) INJECTION
Status: DISCONTINUED | OUTPATIENT
Start: 2023-12-29 | End: 2024-01-01 | Stop reason: HOSPADM

## 2023-12-29 RX ORDER — TALC
6 POWDER (GRAM) TOPICAL NIGHTLY PRN
Status: DISCONTINUED | OUTPATIENT
Start: 2023-12-29 | End: 2024-01-01 | Stop reason: HOSPADM

## 2023-12-29 RX ORDER — IPRATROPIUM BROMIDE AND ALBUTEROL SULFATE 2.5; .5 MG/3ML; MG/3ML
3 SOLUTION RESPIRATORY (INHALATION) EVERY 8 HOURS
Status: DISCONTINUED | OUTPATIENT
Start: 2023-12-29 | End: 2024-01-01 | Stop reason: HOSPADM

## 2023-12-29 RX ORDER — SODIUM CHLORIDE 9 MG/ML
INJECTION, SOLUTION INTRAVENOUS CONTINUOUS
Status: DISCONTINUED | OUTPATIENT
Start: 2023-12-29 | End: 2023-12-31

## 2023-12-29 RX ORDER — IBUPROFEN 200 MG
24 TABLET ORAL
Status: DISCONTINUED | OUTPATIENT
Start: 2023-12-29 | End: 2024-01-01 | Stop reason: HOSPADM

## 2023-12-29 RX ORDER — GLUCAGON 1 MG
1 KIT INJECTION
Status: DISCONTINUED | OUTPATIENT
Start: 2023-12-29 | End: 2024-01-01 | Stop reason: HOSPADM

## 2023-12-29 RX ORDER — ENOXAPARIN SODIUM 100 MG/ML
30 INJECTION SUBCUTANEOUS EVERY 24 HOURS
Status: DISCONTINUED | OUTPATIENT
Start: 2023-12-29 | End: 2023-12-29

## 2023-12-29 RX ORDER — ENOXAPARIN SODIUM 100 MG/ML
40 INJECTION SUBCUTANEOUS EVERY 24 HOURS
Status: DISCONTINUED | OUTPATIENT
Start: 2023-12-29 | End: 2024-01-01 | Stop reason: HOSPADM

## 2023-12-29 RX ORDER — NALOXONE HCL 0.4 MG/ML
0.4 VIAL (ML) INJECTION
Status: DISCONTINUED | OUTPATIENT
Start: 2023-12-29 | End: 2024-01-01 | Stop reason: HOSPADM

## 2023-12-29 RX ADMIN — SODIUM CHLORIDE: 9 INJECTION, SOLUTION INTRAVENOUS at 08:12

## 2023-12-29 RX ADMIN — PIPERACILLIN AND TAZOBACTAM 4.5 G: 4; .5 INJECTION, POWDER, FOR SOLUTION INTRAVENOUS at 05:12

## 2023-12-29 RX ADMIN — IPRATROPIUM BROMIDE AND ALBUTEROL SULFATE 3 ML: 2.5; .5 SOLUTION RESPIRATORY (INHALATION) at 03:12

## 2023-12-29 RX ADMIN — PIPERACILLIN AND TAZOBACTAM 4.5 G: 4; .5 INJECTION, POWDER, FOR SOLUTION INTRAVENOUS at 08:12

## 2023-12-29 RX ADMIN — SODIUM CHLORIDE: 9 INJECTION, SOLUTION INTRAVENOUS at 01:12

## 2023-12-29 RX ADMIN — ENOXAPARIN SODIUM 40 MG: 40 INJECTION SUBCUTANEOUS at 04:12

## 2023-12-29 RX ADMIN — PIPERACILLIN AND TAZOBACTAM 4.5 G: 4; .5 INJECTION, POWDER, FOR SOLUTION INTRAVENOUS at 01:12

## 2023-12-29 RX ADMIN — SODIUM CHLORIDE: 9 INJECTION, SOLUTION INTRAVENOUS at 05:12

## 2023-12-29 NOTE — CARE UPDATE
Patient seen and examined  States he has a cough and headache  Lungs mild scattered wheeze   No chest pain, fever or chills.   Added bronchodilators   Continue iv fluids   Cont iv zosyn for now. Stop iv vancomycin   Continue PEC for now, probably can rescind PEC in am if he is mentally stable    Advised to quit illicit drug use asap

## 2023-12-29 NOTE — NURSING
Nurses Note -- 4 Eyes      12/29/2023   12:13 AM      Skin assessed during: Admit      [x] No Altered Skin Integrity Present    [x]Prevention Measures Documented      [] Yes- Altered Skin Integrity Present or Discovered   [] LDA Added if Not in Epic (Describe Wound)   [] New Altered Skin Integrity was Present on Admit and Documented in LDA   [] Wound Image Taken    Wound Care Consulted? No    Attending Nurse:  Osvaldo Bettencourt RN    Second RN/Staff Member:   Jack Murrell LPN

## 2023-12-29 NOTE — H&P
[facility] Hospital Medicine History & Physical Examination       Patient Name: Sreedhar Padilla  MRN: [id number]  Patient Class: OP- Observation   Admission Date: 12/28/2023  2:46 PM  Length of Stay: 0  Admitting Service: Hospital Medicine   Attending Physician: Alirio Zhong MD   Primary Care Provider: No, Primary Doctor  History source: EMR, patient and/or patient's family    CHIEF COMPLAINT   Drug Overdose ([facility]. Received 4 mg Narcan PTA. CBG 60, received 100 ml D10 in route via ems. GCS 9 on arrival)    HISTORY OF PRESENT ILLNESS:   Patient is a 28-year-old male brought in after he was found unresponsive at his hotel, and placed on a PEC for altered mentation on arrival.  Received Narcan in route with some improvement in his mentation.  At the time of my assessment very difficult historian, but states that he took a pill that someone gave him and he was unsure of the contents.  He adamantly denies IV drug use.    Workup in the ER significant for a leukocytosis of 26 K, acute kidney injury with a creatinine of 2, hypoglycemia requiring 2 D10 boluses, an elevated lactic acid that has since normalized, mildly elevated troponin, and a UDS positive for amphetamines fentanyl and cannabinoids.  Chest x-ray suggested pulmonary edema based on bilateral ground-glass reticulonodular opacities, and CT head showed no acute process.    PAST MEDICAL HISTORY:   No past medical history on file.    PAST SURGICAL HISTORY:   No past surgical history on file.    ALLERGIES:   Patient has no allergy information on record.    FAMILY HISTORY:   Reviewed and non-contributory     SOCIAL HISTORY:   Smokes tobacco   Drinks alcohol will not delineate frequency   Denies IV drug use    HOME MEDICATIONS:     Prior to Admission medications    Not on File       REVIEW OF SYSTEMS:   Except as documented, all other systems reviewed and negative     PHYSICAL EXAM:   T 98.5 °F (36.9 °C)   BP  "97/60   P 78   RR 18   O2 (!) 94 %  GENERAL:  Drowsy, selectively responsive  HEENT: normocephalic atraumatic   NECK: supple   LUNGS: Clear bilaterally, no wheezing or rales, no accessory muscle use   CVS: Regular rate and rhythm, normal peripheral perfusion  ABD: Soft, non-tender, non-distended, bowel sounds present  EXTREMITIES: no clubbing or cyanosis  SKIN: Warm, dry.   NEURO:  Drowsy grossly without focal deficits  PSYCHIATRIC: Cooperative    LABS AND IMAGING:     Recent Labs     12/28/23  1514   WBC 26.42  26.42*   RBC 5.41   HGB 16.7   HCT 53.0*   MCV 98.0*   MCH 30.9   MCHC 31.5*   RDW 14.3        Recent Labs     12/28/23  1751 12/28/23  2313   LACTIC 2.8* 1.8     No results for input(s): "INR", "APTT", "D-DIMER" in the last 72 hours.  No results for input(s): "HGBA1C", "CHOL", "TRIG", "LDL", "VLDL", "HDL" in the last 72 hours.   Recent Labs     12/28/23  1514      K 4.6   CHLORIDE 104   CO2 21*   BUN 16.6   CREATININE 2.08*   GLUCOSE 42*   CALCIUM 9.1   MG 2.40   ALBUMIN 4.1   GLOBULIN 2.9   ALKPHOS 81   ALT 84*   AST 87*   BILITOT 0.3   TSH 1.552     Recent Labs     12/28/23  1514 12/28/23  1751   BNP  --  36.5   TROPONINI 0.076*  --           X-Ray Chest AP Portable  Narrative: EXAMINATION:  XR CHEST AP PORTABLE    CLINICAL HISTORY:  unresponsive episode;    TECHNIQUE:  One view    COMPARISON:  None available.    FINDINGS:  Cardiopericardial silhouette is within normal limits.  Bilateral lungs are remarkable for ground-glass and reticulonodular opacities mostly about the central location and suggest moderate pulmonary edema.  No focally dense consolidation.  No significant fluid within the pleural spaces.  No pneumothorax.  Impression: Findings suggest pulmonary edema.    Electronically signed by: Henrique Hawley  Date:    12/28/2023  Time:    16:11  CT Head Without Contrast  EXAMINATION  CT HEAD WITHOUT CONTRAST    CLINICAL HISTORY  Mental status change, unknown cause;    TECHNIQUE  Axial " non-contrast CT images of the head were acquired and multiplanar reconstructions accomplished by a CT technologist at a separate workstation, pushed to PACS for physician review.    COMPARISON  None available at the time of the initial interpretation.    FINDINGS  Images were reviewed in subdural, brain, soft tissue, and bone windows.    Exam quality: Motion/streak artifact limits assessment of the posterior fossa.    Hemorrhage: No evidence of acute hyperattenuating blood products.    Parenchyma: No discrete mass, localized mass-effect, or CT evidence of an acute territorial cortical-based ischemic insult. Gray-white differentiation is preserved.    Midline shift: None.    CSF spaces: Normal ventricle size and configuration. No extra-axial masses or expansile fluid collections.    Vasculature: No hyperdense artery identified. No abnormal densities within the dural sinuses.    Other findings: No abnormalities of the scalp or subjacent osseous structures. Mastoids are well aerated. No focal abnormality of the sella. The included facial structures are unremarkable.    IMPRESSION  No CT-evident acute intracranial abnormality.    RADIATION DOSE  Automated tube current modulation, weight-based exposure dosing, and/or iterative reconstruction technique utilized to reach lowest reasonably achievable exposure rate.    DLP: 2353 mGy*cm    Electronically signed by: Giovanny Acevedo  Date:    12/28/2023  Time:    15:56      ASSESSMENT & PLAN:   Overdose, likely fentanyl, intention unknown  Toxic encephalopathy secondary to above   Acute kidney injury  Mildly elevated troponin   Hypoxia,?Aspiration pneumonitis    -possibly unconscious for some time with aspiration, blood cultures, start empiric antibiotics, close monitoring while oxygenation status and Narcan as needed for somnolence  -IV fluids, monitor renal function, check CPK  -trend cardiac enzymes suspicion for ACS low , echo  -maybe able to lift pec if sensorium  clears    DVT prophylaxis: lovenox   Code status: full     If patient was admitted under observational status it is with my approval/permission.     At least 55 min was spent on this history and physical.  Time seen: 1AM 12/29/23  Critical care time = 35 min; Critical care diagnosis = toxic encephalopathy    Alirio Zhong MD

## 2023-12-29 NOTE — PLAN OF CARE
12/29/23 1131   Discharge Assessment   Assessment Type Discharge Planning Assessment   Confirmed/corrected address, phone number and insurance Yes   Confirmed Demographics Correct on Facesheet   Source of Information patient   Does patient/caregiver understand observation status Yes   Communicated PETAR with patient/caregiver Date not available/Unable to determine   Reason For Admission 28-year-old male brought in after he was found unresponsive at his hotel, and placed on a PEC for altered mentation on arrival.  Received Narcan in route with some improvement in his mentation. States that he took a pill that someone gave him and he was unsure of the contents.  He adamantly denies IV drug use.Workup in the ER significant for a leukocytosis of 26 K, acute kidney injury with a creatinine of 2, hypoglycemia requiring 2 D10 boluses, an elevated lactic acid that has since normalized, mildly elevated troponin, and a UDS positive for amphetamines fentanyl and cannabinoids.   People in Home friend(s)  (The patient states that he lives in a Motel near [facility] ([place]) with a friend. He is unable to recall the name of the Motel. He syates that he had a headache and took a pill that his roommate gave him which caused him to have an AMS.)   Facility Arrived From: Private residence: Motel room on [facility] ([place], [place]).   Do you have help at home or someone to help you manage your care at home? Yes   Who are your caregiver(s) and their phone number(s)? Sean Padilla (Father) [phone] (Mobile)   Prior to hospitilization cognitive status: Alert/Oriented   Current cognitive status: Alert/Oriented   Walking or Climbing Stairs Difficulty no   Dressing/Bathing Difficulty no   Home Accessibility   (There are (3) steps to climb prior to entering his motel room.)   Home Layout Able to live on 1st floor   Equipment Currently Used at Home glucometer   Readmission within 30 days? No   Do you currently  have service(s) that help you manage your care at home? No   Do you take prescription medications? Yes   Do you have prescription coverage? Yes   Coverage Payor: MEDICAID - [facility] COMMUNITY PLAN [facility] ([place] MEDICAID) -   Do you have any problems affording any of your prescribed medications? No   Is the patient taking medications as prescribed? yes   Who is going to help you get home at discharge? Sean Padilla (Father) [phone] (Mobile)   How do you get to doctors appointments? family or friend will provide   Are you on dialysis? No   Do you take coumadin? No   Discharge Plan A Home   Discharge Plan B Home with family   DME Needed Upon Discharge  none   Discharge Plan discussed with: Patient   Transition of Care Barriers None   Physical Activity   On average, how many days per week do you engage in moderate to strenuous exercise (like a brisk walk)? 3 days   On average, how many minutes do you engage in exercise at this level? 30 min   Financial Resource Strain   How hard is it for you to pay for the very basics like food, housing, medical care, and heating? Hard   Housing Stability   In the last 12 months, was there a time when you were not able to pay the mortgage or rent on time? Y   In the last 12 months, how many places have you lived? 2   In the last 12 months, was there a time when you did not have a steady place to sleep or slept in a shelter (including now)? Y   Transportation Needs   In the past 12 months, has lack of transportation kept you from medical appointments or from getting medications? yes   In the past 12 months, has lack of transportation kept you from meetings, work, or from getting things needed for daily living? Yes   Food Insecurity   Within the past 12 months, you worried that your food would run out before you got the money to buy more. Sometimes   Within the past 12 months, the food you bought just didn't last and you didn't have money to get more. Sometimes   Stress   Do you feel  stress - tense, restless, nervous, or anxious, or unable to sleep at night because your mind is troubled all the time - these days? Rather much   Social Connections   In a typical week, how many times do you talk on the phone with family, friends, or neighbors? Once a week   How often do you get together with friends or relatives? Once   How often do you attend Jewish or Faith services? Never   Do you belong to any clubs or organizations such as Jewish groups, unions, fraternal or athletic groups, or school groups? No   How often do you attend meetings of the clubs or organizations you belong to? Never   Are you , , , , never , or living with a partner? Never marrie   Alcohol Use   Q1: How often do you have a drink containing alcohol? 4 or more ti   Q2: How many drinks containing alcohol do you have on a typical day when you are drinking? 3 or 4   Q3: How often do you have six or more drinks on one occasion? Never   OTHER   Name(s) of People in Home Estefania Padillakaren (Father) [phone] (Mobile)

## 2023-12-29 NOTE — PROGRESS NOTES
"Pharmacokinetic Initial Assessment: IV Vancomycin    Assessment/Plan:    Initiate intravenous vancomycin with loading dose of 2000 mg once with subsequent doses when random concentrations are less than 20 mcg/mL  Desired empiric serum trough concentration is 15 to 20 mcg/mL  Draw vancomycin random level on 12/29 at 1000.  Pharmacy will continue to follow and monitor vancomycin.      Please contact pharmacy at extension [phone] with any questions regarding this assessment.     Thank you for the consult,   Angella Cole       Patient brief summary:  Sreedhar Padilla is a 28 y.o. male initiated on antimicrobial therapy with IV Vancomycin for treatment of suspected sepsis    Drug Allergies:   Review of patient's allergies indicates:  Not on File    Actual Body Weight:   113.4 kg    Renal Function:   Estimated Creatinine Clearance: 65.1 mL/min (A) (based on SCr of 2.08 mg/dL (H)).,     Dialysis Method (if applicable):  CHYNA ( Scr 2.08)    CBC (last 72 hours):  Recent Labs   Lab Result Units 12/28/23  1514   WBC x10(3)/mcL 26.42  26.42*   Hgb g/dL 16.7   Hct % 53.0*   Platelet x10(3)/mcL 337   Monocytes % % 4       Metabolic Panel (last 72 hours):  Recent Labs   Lab Result Units 12/28/23  1514   Sodium Level mmol/L 140   Potassium Level mmol/L 4.6   Chloride mmol/L 104   Carbon Dioxide mmol/L 21*   Glucose Level mg/dL 42*   Blood Urea Nitrogen mg/dL 16.6   Creatinine mg/dL 2.08*   Albumin Level g/dL 4.1   Bilirubin Total mg/dL 0.3   Alkaline Phosphatase unit/L 81   Aspartate Aminotransferase unit/L 87*   Alanine Aminotransferase unit/L 84*   Magnesium Level mg/dL 2.40       Drug levels (last 3 results):  No results for input(s): "VANCOMYCINRA", "VANCORANDOM", "VANCOMYCINPE", "VANCOPEAK", "VANCOMYCINTR", "VANCOTROUGH" in the last 72 hours.    Microbiologic Results:  Microbiology Results (last 7 days)       Procedure Component Value Units Date/Time    Blood Culture [[id number]]     Order Status: Sent Specimen: Blood, Venous     " Blood Culture [[id number]]     Order Status: Sent Specimen: Blood, Venous

## 2023-12-29 NOTE — CONSULTS
Inpatient consult to Cardiology  Consult performed by: Avel Castrejon FNP  Consult ordered by: Koko Anna MD  Reason for consult: Elevated Troponin        [facility]    Cardiology  Consult Note    Patient Name: Sreedhar Padilla  MRN: [id number]  Admission Date: 12/28/2023  Hospital Length of Stay: 0 days  Code Status: Full Code   Attending Provider: Alirio Zhong MD   Consulting Provider: LUANA Fried  Primary Care Physician: Jeannette, Primary Doctor  Principal Problem:Unresponsive episode    Patient information was obtained from patient, past medical records, ER records, and primary team.     Subjective:   Consultation Reason: Elevated Troponin (Drug Overdose)    HPI:   Mr. Padilla is a 28 year old male, unknown to [facility], who presented to the hospital after having been found unresponsive at his hotel. He was PEC'd upon arrival due to altered mentation. Received Narcan en route with some improvement in his mentation. Not a great historian therefore past medical history hard to obtain. He denied IV Drug Use. Noted to have leukocytosis with acute kidney injury. Hypoglycemia requiring Dextrose 10% boluses. UDS Positive for Amphetamines, Fentanyl, and Cannabinoids. Chest XR revealed findings of pulmonary vascular congestion with bilateral ground-glass reticulonodular opacities, and CT Head revealed no acute adverse process. Troponin is mildly elevated. [facility] consulted given abnormal troponin level.     PMH: None  PSH: None  Family History: Non-contributory.  Social History: Tobacco- Active Smoker, Alcohol- Negative, Substance Abuse- UDS Positive for Amphetamines/Fentanyl/Cannabinoids     Previous Cardiac Diagnostics:   No Cardiac Diagnostics on File.    Review of patient's allergies indicates:  Not on File    No current facility-administered medications on file prior to encounter.     No current outpatient medications on file prior to encounter.     Review of Systems    Respiratory:  Negative for chest tightness and shortness of breath.    Cardiovascular:  Negative for chest pain.     Objective:     Vital Signs (Most Recent):  Temp: 98.2 °F (36.8 °C) (12/29/23 0653)  Pulse: 91 (12/29/23 0653)  Resp: 18 (12/29/23 0300)  BP: (!) 101/58 (12/29/23 0653)  SpO2: (!) 90 % (12/29/23 0653) Vital Signs (24h Range):  Temp:  [98 °F (36.7 °C)-99.3 °F (37.4 °C)] 98.2 °F (36.8 °C)  Pulse:  [] 91  Resp:  [13-20] 18  SpO2:  [90 %-100 %] 90 %  BP: ()/(58-96) 101/58     Weight: 110.2 kg (242 lb 15.2 oz)  Body mass index is 35.88 kg/m².    SpO2: (!) 90 %         Intake/Output Summary (Last 24 hours) at 12/29/2023 0812  Last data filed at 12/28/2023 2115  Gross per 24 hour   Intake 1498.5 ml   Output 300 ml   Net 1198.5 ml       Lines/Drains/Airways       Peripheral Intravenous Line  Duration                  Peripheral IV - Single Lumen 12/28/23 1530 20 G Anterior;Left <1 day                    Significant Labs:  Recent Results (from the past 72 hour(s))   POCT glucose    Collection Time: 12/28/23  2:50 PM   Result Value Ref Range    POCT Glucose 103 70 - 110 mg/dL   Comprehensive metabolic panel    Collection Time: 12/28/23  3:14 PM   Result Value Ref Range    Sodium Level 140 136 - 145 mmol/L    Potassium Level 4.6 3.5 - 5.1 mmol/L    Chloride 104 98 - 107 mmol/L    Carbon Dioxide 21 (L) 22 - 29 mmol/L    Glucose Level 42 (LL) 74 - 100 mg/dL    Blood Urea Nitrogen 16.6 8.9 - 20.6 mg/dL    Creatinine 2.08 (H) 0.73 - 1.18 mg/dL    Calcium Level Total 9.1 8.4 - 10.2 mg/dL    Protein Total 7.0 6.4 - 8.3 gm/dL    Albumin Level 4.1 3.5 - 5.0 g/dL    Globulin 2.9 2.4 - 3.5 gm/dL    Albumin/Globulin Ratio 1.4 1.1 - 2.0 ratio    Bilirubin Total 0.3 <=1.5 mg/dL    Alkaline Phosphatase 81 40 - 150 unit/L    Alanine Aminotransferase 84 (H) 0 - 55 unit/L    Aspartate Aminotransferase 87 (H) 5 - 34 unit/L    eGFR 39 mls/min/1.73/m2   Magnesium    Collection Time: 12/28/23  3:14 PM   Result Value Ref  Range    Magnesium Level 2.40 1.60 - 2.60 mg/dL   Troponin I    Collection Time: 12/28/23  3:14 PM   Result Value Ref Range    Troponin-I 0.076 (H) 0.000 - 0.045 ng/mL   TSH    Collection Time: 12/28/23  3:14 PM   Result Value Ref Range    TSH 1.552 0.350 - 4.940 uIU/mL   CBC with Differential    Collection Time: 12/28/23  3:14 PM   Result Value Ref Range    WBC 26.42 (H) 4.50 - 11.50 x10(3)/mcL    RBC 5.41 4.70 - 6.10 x10(6)/mcL    Hgb 16.7 14.0 - 18.0 g/dL    Hct 53.0 (H) 42.0 - 52.0 %    MCV 98.0 (H) 80.0 - 94.0 fL    MCH 30.9 27.0 - 31.0 pg    MCHC 31.5 (L) 33.0 - 36.0 g/dL    RDW 14.3 11.5 - 17.0 %    Platelet 337 130 - 400 x10(3)/mcL    MPV 10.4 7.4 - 10.4 fL    NRBC% 0.0 %   Manual Differential    Collection Time: 12/28/23  3:14 PM   Result Value Ref Range    WBC 26.42 x10(3)/mcL    Neutrophils % 92 %    Lymphs % 4 %    Monocytes % 4 %    Metamyelocytes % 1 (H) <=0 %    Neutrophils Abs 24.3064 (H) 2.1 - 9.2 x10(3)/mcL    Lymphs Abs 1.0568 0.6 - 4.6 x10(3)/mcL    Monocytes Abs 1.0568 0.1 - 1.3 x10(3)/mcL    Platelets Normal Normal, Adequate    RBC Morph Abnormal (A) Normal    Macrocytosis 1+ (A) (none)   COVID/FLU A&B PCR    Collection Time: 12/28/23  4:12 PM   Result Value Ref Range    Influenza A PCR Not Detected Not Detected    Influenza B PCR Not Detected Not Detected    SARS-CoV-2 PCR Not Detected Not Detected, Negative   Brain natriuretic peptide    Collection Time: 12/28/23  5:51 PM   Result Value Ref Range    Natriuretic Peptide 36.5 <=100.0 pg/mL   Lactic acid, plasma    Collection Time: 12/28/23  5:51 PM   Result Value Ref Range    Lactic Acid Level 2.8 (H) 0.5 - 2.2 mmol/L   POCT glucose    Collection Time: 12/28/23  6:11 PM   Result Value Ref Range    POCT Glucose 176 (H) 70 - 110 mg/dL   POCT glucose    Collection Time: 12/28/23  8:01 PM   Result Value Ref Range    POCT Glucose 68 (L) 70 - 110 mg/dL   Urinalysis, Reflex to Urine Culture    Collection Time: 12/28/23  8:41 PM    Specimen: Urine    Result Value Ref Range    Color, UA Yellow Yellow, Light-Yellow, Colorless, Straw, Dark-Yellow    Appearance, UA Clear Clear    Specific Gravity, UA 1.020 1.005 - 1.030    pH, UA 5.5 5.0 - 8.5    Protein, UA 1+ (A) Negative    Glucose, UA 1+ (A) Negative, Normal    Ketones, UA 1+ (A) Negative    Blood, UA Negative Negative    Bilirubin, UA Negative Negative    Urobilinogen, UA Normal 0.2, 1.0, Normal    Nitrites, UA Negative Negative    Leukocyte Esterase, UA Negative Negative    WBC, UA 0-5 None Seen, 0-2, 3-5, 0-5 /HPF    Bacteria, UA Trace None Seen, Trace /HPF    Squamous Epithelial Cells, UA None Seen None Seen /HPF    Sperm, UA Few (A) None Seen /HPF    RBC, UA 0-5 None Seen, 0-2, 3-5, 0-5 /HPF   Drug Screen, Urine    Collection Time: 12/28/23  8:41 PM   Result Value Ref Range    Amphetamines, Urine Positive (A) Negative    Barbituates, Urine Negative Negative    Benzodiazepine, Urine Negative Negative    Cannabinoids, Urine Positive (A) Negative    Cocaine, Urine Negative Negative    Fentanyl, Urine Positive (A) Negative    MDMA, Urine Negative Negative    Opiates, Urine Negative Negative    Phencyclidine, Urine Negative Negative    pH, Urine 5.5 3.0 - 11.0    Specific Gravity, Urine Auto 1.020 1.001 - 1.035   POCT glucose    Collection Time: 12/28/23 11:11 PM   Result Value Ref Range    POCT Glucose 86 70 - 110 mg/dL   Lactic Acid, Plasma    Collection Time: 12/28/23 11:13 PM   Result Value Ref Range    Lactic Acid Level 1.8 0.5 - 2.2 mmol/L   Acetaminophen level    Collection Time: 12/28/23 11:13 PM   Result Value Ref Range    Acetaminophen Level <17.4 (L) 17.4 - 30.0 ug/ml   Ethanol    Collection Time: 12/28/23 11:13 PM   Result Value Ref Range    Ethanol Level <10.0 <=10.0 mg/dL   Troponin I    Collection Time: 12/29/23  4:28 AM   Result Value Ref Range    Troponin-I 0.442 (H) 0.000 - 0.045 ng/mL   Comprehensive Metabolic Panel    Collection Time: 12/29/23  4:28 AM   Result Value Ref Range    Sodium  Level 136 136 - 145 mmol/L    Potassium Level 4.4 3.5 - 5.1 mmol/L    Chloride 103 98 - 107 mmol/L    Carbon Dioxide 21 (L) 22 - 29 mmol/L    Glucose Level 73 (L) 74 - 100 mg/dL    Blood Urea Nitrogen 13.5 8.9 - 20.6 mg/dL    Creatinine 1.33 (H) 0.73 - 1.18 mg/dL    Calcium Level Total 8.6 8.4 - 10.2 mg/dL    Protein Total 5.9 (L) 6.4 - 8.3 gm/dL    Albumin Level 3.5 3.5 - 5.0 g/dL    Globulin 2.4 2.4 - 3.5 gm/dL    Albumin/Globulin Ratio 1.5 1.1 - 2.0 ratio    Bilirubin Total 0.6 <=1.5 mg/dL    Alkaline Phosphatase 59 40 - 150 unit/L    Alanine Aminotransferase 59 (H) 0 - 55 unit/L    Aspartate Aminotransferase 41 (H) 5 - 34 unit/L    eGFR >60 mls/min/1.73/m2   CBC with Differential    Collection Time: 12/29/23  4:28 AM   Result Value Ref Range    WBC 26.83 (H) 4.50 - 11.50 x10(3)/mcL    RBC 4.57 (L) 4.70 - 6.10 x10(6)/mcL    Hgb 14.0 14.0 - 18.0 g/dL    Hct 43.2 42.0 - 52.0 %    MCV 94.5 (H) 80.0 - 94.0 fL    MCH 30.6 27.0 - 31.0 pg    MCHC 32.4 (L) 33.0 - 36.0 g/dL    RDW 14.1 11.5 - 17.0 %    Platelet 300 130 - 400 x10(3)/mcL    MPV 10.3 7.4 - 10.4 fL    NRBC% 0.0 %   CK    Collection Time: 12/29/23  4:28 AM   Result Value Ref Range    Creatine Kinase 359 (H) 30 - 200 U/L   Manual Differential    Collection Time: 12/29/23  4:28 AM   Result Value Ref Range    WBC 26.83 x10(3)/mcL    Neutrophils % 94 %    Lymphs % 3 %    Monocytes % 3 %    Neutrophils Abs 25.2202 (H) 2.1 - 9.2 x10(3)/mcL    Lymphs Abs 0.8049 0.6 - 4.6 x10(3)/mcL    Monocytes Abs 0.8049 0.1 - 1.3 x10(3)/mcL    Platelets Normal Normal, Adequate    RBC Morph Normal Normal       Significant Imaging:  Imaging Results              X-Ray Chest AP Portable (Final result)  Result time 12/28/23 16:11:41      Final result by Henrique Hawley MD (12/28/23 16:11:41)                   Impression:      Findings suggest pulmonary edema.      Electronically signed by: Henrique Hawley  Date:    12/28/2023  Time:    16:11               Narrative:    EXAMINATION:  XR  CHEST AP PORTABLE    CLINICAL HISTORY:  unresponsive episode;    TECHNIQUE:  One view    COMPARISON:  None available.    FINDINGS:  Cardiopericardial silhouette is within normal limits.  Bilateral lungs are remarkable for ground-glass and reticulonodular opacities mostly about the central location and suggest moderate pulmonary edema.  No focally dense consolidation.  No significant fluid within the pleural spaces.  No pneumothorax.                                       CT Head Without Contrast (Final result)  Result time 12/28/23 15:56:58      Final result by Giovanny Acevedo MD (12/28/23 15:56:58)                   Narrative:    EXAMINATION  CT HEAD WITHOUT CONTRAST    CLINICAL HISTORY  Mental status change, unknown cause;    TECHNIQUE  Axial non-contrast CT images of the head were acquired and multiplanar reconstructions accomplished by a CT technologist at a separate workstation, pushed to PACS for physician review.    COMPARISON  None available at the time of the initial interpretation.    FINDINGS  Images were reviewed in subdural, brain, soft tissue, and bone windows.    Exam quality: Motion/streak artifact limits assessment of the posterior fossa.    Hemorrhage: No evidence of acute hyperattenuating blood products.    Parenchyma: No discrete mass, localized mass-effect, or CT evidence of an acute territorial cortical-based ischemic insult. Gray-white differentiation is preserved.    Midline shift: None.    CSF spaces: Normal ventricle size and configuration. No extra-axial masses or expansile fluid collections.    Vasculature: No hyperdense artery identified. No abnormal densities within the dural sinuses.    Other findings: No abnormalities of the scalp or subjacent osseous structures. Mastoids are well aerated. No focal abnormality of the sella. The included facial structures are unremarkable.    IMPRESSION  No CT-evident acute intracranial abnormality.    RADIATION DOSE  Automated tube current modulation,  weight-based exposure dosing, and/or iterative reconstruction technique utilized to reach lowest reasonably achievable exposure rate.    DLP: 2353 mGy*cm      Electronically signed by: Giovanny Acevedo  Date:    12/28/2023  Time:    15:56                                    EKG:        Telemetry:  SR    Physical Exam  Vitals and nursing note reviewed.   Constitutional:       General: He is not in acute distress.  HENT:      Head: Normocephalic.      Mouth/Throat:      Mouth: Mucous membranes are moist.      Pharynx: Oropharynx is clear.   Cardiovascular:      Rate and Rhythm: Normal rate and regular rhythm.      Heart sounds: Normal heart sounds.   Pulmonary:      Effort: Pulmonary effort is normal. No respiratory distress.      Breath sounds: Normal breath sounds.   Abdominal:      Palpations: Abdomen is soft.   Skin:     General: Skin is warm and dry.   Neurological:      Comments: Drowsy       Home Medications:   No current facility-administered medications on file prior to encounter.     No current outpatient medications on file prior to encounter.     Current Inpatient Medications:    Current Facility-Administered Medications:     0.9%  NaCl infusion, , Intravenous, Continuous, Alirio Zhong MD, Last Rate: 125 mL/hr at 12/29/23 0516, New Bag at 12/29/23 0516    dextrose 10% bolus 125 mL 125 mL, 12.5 g, Intravenous, PRN, Alirio Zhong MD    dextrose 10% bolus 250 mL 250 mL, 25 g, Intravenous, PRN, Alirio Zhong MD    diphenhydrAMINE capsule 50 mg, 50 mg, Oral, Q4H PRN, Koko Anna MD    diphenhydrAMINE injection 50 mg, 50 mg, Intramuscular, Q4H PRN, Koko Anna MD    enoxaparin injection 40 mg, 40 mg, Subcutaneous, Daily, Alirio Zhong MD    glucagon (human recombinant) injection 1 mg, 1 mg, Intramuscular, PRN, Alirio Zhong MD    glucose chewable tablet 16 g, 16 g, Oral, PRN, Alirio Zhong MD    glucose chewable tablet 24 g, 24 g, Oral, PRN, Trevin  Alirio SCHWARZ MD    haloperidol lactate injection 5 mg, 5 mg, Intramuscular, Q4H PRN, Koko Anna MD    haloperidoL tablet 5 mg, 5 mg, Oral, Q4H PRN, Koko Anna MD    LORazepam tablet 2 mg, 2 mg, Oral, Q4H PRN, Koko Anna MD    melatonin tablet 6 mg, 6 mg, Oral, Nightly PRN, Alirio Zhnog MD    naloxone 0.4 mg/mL injection 0.4 mg, 0.4 mg, Intravenous, PRN, Koko Anna MD    naloxone 0.4 mg/mL injection 0.4 mg, 0.4 mg, Intravenous, PRN, Alirio Zhong MD    piperacillin-tazobactam (ZOSYN) 4.5 g in dextrose 5 % in water (D5W) 100 mL IVPB (MB+), 4.5 g, Intravenous, Q8H, Alirio Zhong MD, Last Rate: 25 mL/hr at 12/29/23 0517, 4.5 g at 12/29/23 0517    sodium chloride 0.9% flush 10 mL, 10 mL, Intravenous, PRN, Alirio Zhong MD    Pharmacy to dose Vancomycin consult, , , Once **AND** vancomycin - pharmacy to dose, , Intravenous, pharmacy to manage frequency, Alirio Zhong MD    VTE Risk Mitigation (From admission, onward)           Ordered     enoxaparin injection 40 mg  Daily         12/29/23 0402     IP VTE HIGH RISK PATIENT  Once         12/29/23 0400     Place sequential compression device  Until discontinued         12/29/23 0400                  Assessment:   NSTEMI- Type II in the Setting of Drug Overdose    - Denies Angina/SOB  Toxic Encephalopathy  Acute Kidney Injury  Acute Hypoxic Respiratory Failure Likely Secondary to Aspiration    - Ground Glass Opacities on Chest XR  No Known History of GI Bleed    Plan:   Check Echocardiogram  Do not suspect ACS  If echo unremarkable, will be available as needed.    Thank you for your consult.     Avel Castrejon, LUANA  Cardiology  [facility]  12/29/2023 8:12 AM     I have seen the patient, reviewed the Nurse Practitioner's note, assessment and plan. I have personally interviewed and examined the patient at bedside and agree with the findings. Medical decision  making listed above were done under my guidance.    Physical exam:  Cardiovascular system: regular rhythm, no murmur.  Lungs: CTAB.  Extremities: No leg edema.    Plan:  Will review echo

## 2023-12-30 LAB
ALBUMIN SERPL-MCNC: 3 G/DL (ref 3.5–5)
ALBUMIN/GLOB SERPL: 1.2 RATIO (ref 1.1–2)
ALP SERPL-CCNC: 47 UNIT/L (ref 40–150)
ALT SERPL-CCNC: 32 UNIT/L (ref 0–55)
AST SERPL-CCNC: 19 UNIT/L (ref 5–34)
BASOPHILS # BLD AUTO: 0.03 X10(3)/MCL
BASOPHILS NFR BLD AUTO: 0.3 %
BILIRUB SERPL-MCNC: 0.5 MG/DL
BUN SERPL-MCNC: 7.5 MG/DL (ref 8.9–20.6)
CALCIUM SERPL-MCNC: 8.3 MG/DL (ref 8.4–10.2)
CHLORIDE SERPL-SCNC: 108 MMOL/L (ref 98–107)
CO2 SERPL-SCNC: 24 MMOL/L (ref 22–29)
CREAT SERPL-MCNC: 1.06 MG/DL (ref 0.73–1.18)
EOSINOPHIL # BLD AUTO: 0.27 X10(3)/MCL (ref 0–0.9)
EOSINOPHIL NFR BLD AUTO: 2.5 %
ERYTHROCYTE [DISTWIDTH] IN BLOOD BY AUTOMATED COUNT: 14.3 % (ref 11.5–17)
GFR SERPLBLD CREATININE-BSD FMLA CKD-EPI: >60 MLS/MIN/1.73/M2
GLOBULIN SER-MCNC: 2.5 GM/DL (ref 2.4–3.5)
GLUCOSE SERPL-MCNC: 119 MG/DL (ref 74–100)
HCT VFR BLD AUTO: 39 % (ref 42–52)
HGB BLD-MCNC: 12.7 G/DL (ref 14–18)
IMM GRANULOCYTES # BLD AUTO: 0.02 X10(3)/MCL (ref 0–0.04)
IMM GRANULOCYTES NFR BLD AUTO: 0.2 %
LYMPHOCYTES # BLD AUTO: 1.71 X10(3)/MCL (ref 0.6–4.6)
LYMPHOCYTES NFR BLD AUTO: 15.6 %
MAGNESIUM SERPL-MCNC: 1.9 MG/DL (ref 1.6–2.6)
MCH RBC QN AUTO: 31 PG (ref 27–31)
MCHC RBC AUTO-ENTMCNC: 32.6 G/DL (ref 33–36)
MCV RBC AUTO: 95.1 FL (ref 80–94)
MONOCYTES # BLD AUTO: 0.61 X10(3)/MCL (ref 0.1–1.3)
MONOCYTES NFR BLD AUTO: 5.6 %
NEUTROPHILS # BLD AUTO: 8.32 X10(3)/MCL (ref 2.1–9.2)
NEUTROPHILS NFR BLD AUTO: 75.8 %
NRBC BLD AUTO-RTO: 0 %
PHOSPHATE SERPL-MCNC: 1.2 MG/DL (ref 2.3–4.7)
PLATELET # BLD AUTO: 250 X10(3)/MCL (ref 130–400)
PMV BLD AUTO: 10.2 FL (ref 7.4–10.4)
POCT GLUCOSE: 139 MG/DL (ref 70–110)
POCT GLUCOSE: 82 MG/DL (ref 70–110)
POTASSIUM SERPL-SCNC: 4.2 MMOL/L (ref 3.5–5.1)
PROT SERPL-MCNC: 5.5 GM/DL (ref 6.4–8.3)
RBC # BLD AUTO: 4.1 X10(6)/MCL (ref 4.7–6.1)
SODIUM SERPL-SCNC: 139 MMOL/L (ref 136–145)
TROPONIN I SERPL-MCNC: 0.18 NG/ML (ref 0–0.04)
WBC # SPEC AUTO: 10.96 X10(3)/MCL (ref 4.5–11.5)

## 2023-12-30 PROCEDURE — 85025 COMPLETE CBC W/AUTO DIFF WBC: CPT | Performed by: STUDENT IN AN ORGANIZED HEALTH CARE EDUCATION/TRAINING PROGRAM

## 2023-12-30 PROCEDURE — 96366 THER/PROPH/DIAG IV INF ADDON: CPT

## 2023-12-30 PROCEDURE — 25000003 PHARM REV CODE 250: Performed by: INTERNAL MEDICINE

## 2023-12-30 PROCEDURE — 96361 HYDRATE IV INFUSION ADD-ON: CPT

## 2023-12-30 PROCEDURE — G0378 HOSPITAL OBSERVATION PER HR: HCPCS

## 2023-12-30 PROCEDURE — 83735 ASSAY OF MAGNESIUM: CPT | Performed by: STUDENT IN AN ORGANIZED HEALTH CARE EDUCATION/TRAINING PROGRAM

## 2023-12-30 PROCEDURE — 96372 THER/PROPH/DIAG INJ SC/IM: CPT | Performed by: INTERNAL MEDICINE

## 2023-12-30 PROCEDURE — 94640 AIRWAY INHALATION TREATMENT: CPT | Mod: XB

## 2023-12-30 PROCEDURE — 94761 N-INVAS EAR/PLS OXIMETRY MLT: CPT

## 2023-12-30 PROCEDURE — 99900035 HC TECH TIME PER 15 MIN (STAT)

## 2023-12-30 PROCEDURE — 80053 COMPREHEN METABOLIC PANEL: CPT | Performed by: STUDENT IN AN ORGANIZED HEALTH CARE EDUCATION/TRAINING PROGRAM

## 2023-12-30 PROCEDURE — 84484 ASSAY OF TROPONIN QUANT: CPT | Performed by: STUDENT IN AN ORGANIZED HEALTH CARE EDUCATION/TRAINING PROGRAM

## 2023-12-30 PROCEDURE — 84100 ASSAY OF PHOSPHORUS: CPT | Performed by: STUDENT IN AN ORGANIZED HEALTH CARE EDUCATION/TRAINING PROGRAM

## 2023-12-30 PROCEDURE — 63600175 PHARM REV CODE 636 W HCPCS: Performed by: INTERNAL MEDICINE

## 2023-12-30 PROCEDURE — 25000242 PHARM REV CODE 250 ALT 637 W/ HCPCS: Performed by: INTERNAL MEDICINE

## 2023-12-30 RX ADMIN — ENOXAPARIN SODIUM 40 MG: 40 INJECTION SUBCUTANEOUS at 05:12

## 2023-12-30 RX ADMIN — IPRATROPIUM BROMIDE AND ALBUTEROL SULFATE 3 ML: 2.5; .5 SOLUTION RESPIRATORY (INHALATION) at 03:12

## 2023-12-30 RX ADMIN — PIPERACILLIN AND TAZOBACTAM 4.5 G: 4; .5 INJECTION, POWDER, FOR SOLUTION INTRAVENOUS at 08:12

## 2023-12-30 RX ADMIN — IPRATROPIUM BROMIDE AND ALBUTEROL SULFATE 3 ML: 2.5; .5 SOLUTION RESPIRATORY (INHALATION) at 12:12

## 2023-12-30 RX ADMIN — PIPERACILLIN AND TAZOBACTAM 4.5 G: 4; .5 INJECTION, POWDER, FOR SOLUTION INTRAVENOUS at 12:12

## 2023-12-30 RX ADMIN — SODIUM CHLORIDE: 9 INJECTION, SOLUTION INTRAVENOUS at 12:12

## 2023-12-30 RX ADMIN — IPRATROPIUM BROMIDE AND ALBUTEROL SULFATE 3 ML: 2.5; .5 SOLUTION RESPIRATORY (INHALATION) at 07:12

## 2023-12-30 RX ADMIN — PIPERACILLIN AND TAZOBACTAM 4.5 G: 4; .5 INJECTION, POWDER, FOR SOLUTION INTRAVENOUS at 06:12

## 2023-12-30 NOTE — PROGRESS NOTES
[facility] - [facility] Tele[facility]    Cardiology  Progress Note    Patient Name: Sreedhar Paidlla  MRN: [id number]  Admission Date: 12/28/2023  Hospital Length of Stay: 0 days  Code Status: Full Code   Attending Provider: Angel Muller MD   Consulting Provider: LUANA Fried  Primary Care Physician: Jeannette, Primary Doctor  Principal Problem:Unresponsive episode    Patient information was obtained from patient, past medical records, ER records, and primary team.     Subjective:   Consultation Reason: Elevated Troponin (Drug Overdose)    HPI:   Mr. Padilla is a 28 year old male, unknown to [facility], who presented to the hospital after having been found unresponsive at his hotel. He was PEC'd upon arrival due to altered mentation. Received Narcan en route with some improvement in his mentation. Not a great historian therefore past medical history hard to obtain. He denied IV Drug Use. Noted to have leukocytosis with acute kidney injury. Hypoglycemia requiring Dextrose 10% boluses. UDS Positive for Amphetamines, Fentanyl, and Cannabinoids. Chest XR revealed findings of pulmonary vascular congestion with bilateral ground-glass reticulonodular opacities, and CT Head revealed no acute adverse process. Troponin is mildly elevated. [facility] consulted given abnormal troponin level.     Hospital Course:  12.30.23: NAD Noted. Resting in Bed. Clinically Stable.     PMH: None  PSH: None  Family History: Non-contributory.  Social History: Tobacco- Active Smoker, Alcohol- Negative, Substance Abuse- UDS Positive for Amphetamines/Fentanyl/Cannabinoids     Previous Cardiac Diagnostics:   Echocardiogram (12.29.23):  Left Ventricle: The left ventricle is normal in size. Normal wall thickness. Normal wall motion. There is normal systolic function with a visually estimated ejection fraction of 55 - 60%. There is normal diastolic function.  Right Ventricle: Normal right ventricular cavity size. Systolic function is normal. TAPSE  is 1.80 cm.  No significant valvular abnormalities noted in this study.    Review of patient's allergies indicates:  Not on File    No current facility-administered medications on file prior to encounter.     No current outpatient medications on file prior to encounter.     Review of Systems   Respiratory:  Negative for chest tightness and shortness of breath.    Cardiovascular:  Negative for chest pain.     Objective:     Vital Signs (Most Recent):  Temp: 98.1 °F (36.7 °C) (12/30/23 0710)  Pulse: 79 (12/30/23 0755)  Resp: 18 (12/30/23 0755)  BP: 108/62 (12/30/23 0710)  SpO2: 96 % (12/30/23 0755) Vital Signs (24h Range):  Temp:  [97.2 °F (36.2 °C)-98.3 °F (36.8 °C)] 98.1 °F (36.7 °C)  Pulse:  [] 79  Resp:  [18-22] 18  SpO2:  [87 %-96 %] 96 %  BP: (103-118)/(55-64) 108/62     Weight: 110.2 kg (242 lb 15.2 oz)  Body mass index is 35.88 kg/m².    SpO2: 96 %         Intake/Output Summary (Last 24 hours) at 12/30/2023 1215  Last data filed at 12/29/2023 1803  Gross per 24 hour   Intake 480 ml   Output --   Net 480 ml         Lines/Drains/Airways       Peripheral Intravenous Line  Duration                  Peripheral IV - Single Lumen 12/28/23 1530 20 G Anterior;Left 1 day                  Significant Labs:  Recent Results (from the past 72 hour(s))   POCT glucose    Collection Time: 12/28/23  2:50 PM   Result Value Ref Range    POCT Glucose 103 70 - 110 mg/dL   Comprehensive metabolic panel    Collection Time: 12/28/23  3:14 PM   Result Value Ref Range    Sodium Level 140 136 - 145 mmol/L    Potassium Level 4.6 3.5 - 5.1 mmol/L    Chloride 104 98 - 107 mmol/L    Carbon Dioxide 21 (L) 22 - 29 mmol/L    Glucose Level 42 (LL) 74 - 100 mg/dL    Blood Urea Nitrogen 16.6 8.9 - 20.6 mg/dL    Creatinine 2.08 (H) 0.73 - 1.18 mg/dL    Calcium Level Total 9.1 8.4 - 10.2 mg/dL    Protein Total 7.0 6.4 - 8.3 gm/dL    Albumin Level 4.1 3.5 - 5.0 g/dL    Globulin 2.9 2.4 - 3.5 gm/dL    Albumin/Globulin Ratio 1.4 1.1 - 2.0 ratio     Bilirubin Total 0.3 <=1.5 mg/dL    Alkaline Phosphatase 81 40 - 150 unit/L    Alanine Aminotransferase 84 (H) 0 - 55 unit/L    Aspartate Aminotransferase 87 (H) 5 - 34 unit/L    eGFR 39 mls/min/1.73/m2   Magnesium    Collection Time: 12/28/23  3:14 PM   Result Value Ref Range    Magnesium Level 2.40 1.60 - 2.60 mg/dL   Troponin I    Collection Time: 12/28/23  3:14 PM   Result Value Ref Range    Troponin-I 0.076 (H) 0.000 - 0.045 ng/mL   TSH    Collection Time: 12/28/23  3:14 PM   Result Value Ref Range    TSH 1.552 0.350 - 4.940 uIU/mL   CBC with Differential    Collection Time: 12/28/23  3:14 PM   Result Value Ref Range    WBC 26.42 (H) 4.50 - 11.50 x10(3)/mcL    RBC 5.41 4.70 - 6.10 x10(6)/mcL    Hgb 16.7 14.0 - 18.0 g/dL    Hct 53.0 (H) 42.0 - 52.0 %    MCV 98.0 (H) 80.0 - 94.0 fL    MCH 30.9 27.0 - 31.0 pg    MCHC 31.5 (L) 33.0 - 36.0 g/dL    RDW 14.3 11.5 - 17.0 %    Platelet 337 130 - 400 x10(3)/mcL    MPV 10.4 7.4 - 10.4 fL    NRBC% 0.0 %   Manual Differential    Collection Time: 12/28/23  3:14 PM   Result Value Ref Range    WBC 26.42 x10(3)/mcL    Neutrophils % 92 %    Lymphs % 4 %    Monocytes % 4 %    Metamyelocytes % 1 (H) <=0 %    Neutrophils Abs 24.3064 (H) 2.1 - 9.2 x10(3)/mcL    Lymphs Abs 1.0568 0.6 - 4.6 x10(3)/mcL    Monocytes Abs 1.0568 0.1 - 1.3 x10(3)/mcL    Platelets Normal Normal, Adequate    RBC Morph Abnormal (A) Normal    Macrocytosis 1+ (A) (none)   COVID/FLU A&B PCR    Collection Time: 12/28/23  4:12 PM   Result Value Ref Range    Influenza A PCR Not Detected Not Detected    Influenza B PCR Not Detected Not Detected    SARS-CoV-2 PCR Not Detected Not Detected, Negative   Brain natriuretic peptide    Collection Time: 12/28/23  5:51 PM   Result Value Ref Range    Natriuretic Peptide 36.5 <=100.0 pg/mL   Lactic acid, plasma    Collection Time: 12/28/23  5:51 PM   Result Value Ref Range    Lactic Acid Level 2.8 (H) 0.5 - 2.2 mmol/L   POCT glucose    Collection Time: 12/28/23  6:11 PM    Result Value Ref Range    POCT Glucose 176 (H) 70 - 110 mg/dL   POCT glucose    Collection Time: 12/28/23  8:01 PM   Result Value Ref Range    POCT Glucose 68 (L) 70 - 110 mg/dL   Urinalysis, Reflex to Urine Culture    Collection Time: 12/28/23  8:41 PM    Specimen: Urine   Result Value Ref Range    Color, UA Yellow Yellow, Light-Yellow, Colorless, Straw, Dark-Yellow    Appearance, UA Clear Clear    Specific Gravity, UA 1.020 1.005 - 1.030    pH, UA 5.5 5.0 - 8.5    Protein, UA 1+ (A) Negative    Glucose, UA 1+ (A) Negative, Normal    Ketones, UA 1+ (A) Negative    Blood, UA Negative Negative    Bilirubin, UA Negative Negative    Urobilinogen, UA Normal 0.2, 1.0, Normal    Nitrites, UA Negative Negative    Leukocyte Esterase, UA Negative Negative    WBC, UA 0-5 None Seen, 0-2, 3-5, 0-5 /HPF    Bacteria, UA Trace None Seen, Trace /HPF    Squamous Epithelial Cells, UA None Seen None Seen /HPF    Sperm, UA Few (A) None Seen /HPF    RBC, UA 0-5 None Seen, 0-2, 3-5, 0-5 /HPF   Drug Screen, Urine    Collection Time: 12/28/23  8:41 PM   Result Value Ref Range    Amphetamines, Urine Positive (A) Negative    Barbituates, Urine Negative Negative    Benzodiazepine, Urine Negative Negative    Cannabinoids, Urine Positive (A) Negative    Cocaine, Urine Negative Negative    Fentanyl, Urine Positive (A) Negative    MDMA, Urine Negative Negative    Opiates, Urine Negative Negative    Phencyclidine, Urine Negative Negative    pH, Urine 5.5 3.0 - 11.0    Specific Gravity, Urine Auto 1.020 1.001 - 1.035   POCT glucose    Collection Time: 12/28/23 11:11 PM   Result Value Ref Range    POCT Glucose 86 70 - 110 mg/dL   Lactic Acid, Plasma    Collection Time: 12/28/23 11:13 PM   Result Value Ref Range    Lactic Acid Level 1.8 0.5 - 2.2 mmol/L   Acetaminophen level    Collection Time: 12/28/23 11:13 PM   Result Value Ref Range    Acetaminophen Level <17.4 (L) 17.4 - 30.0 ug/ml   Ethanol    Collection Time: 12/28/23 11:13 PM   Result Value  Ref Range    Ethanol Level <10.0 <=10.0 mg/dL   Blood Culture    Collection Time: 12/28/23 11:13 PM    Specimen: Arm, Right; Blood   Result Value Ref Range    CULTURE, BLOOD ([facility]) No Growth At 24 Hours    Blood Culture    Collection Time: 12/29/23 12:32 AM    Specimen: Blood, Venous   Result Value Ref Range    CULTURE, BLOOD ([facility]) No Growth At 24 Hours    Troponin I    Collection Time: 12/29/23  4:28 AM   Result Value Ref Range    Troponin-I 0.442 (H) 0.000 - 0.045 ng/mL   Comprehensive Metabolic Panel    Collection Time: 12/29/23  4:28 AM   Result Value Ref Range    Sodium Level 136 136 - 145 mmol/L    Potassium Level 4.4 3.5 - 5.1 mmol/L    Chloride 103 98 - 107 mmol/L    Carbon Dioxide 21 (L) 22 - 29 mmol/L    Glucose Level 73 (L) 74 - 100 mg/dL    Blood Urea Nitrogen 13.5 8.9 - 20.6 mg/dL    Creatinine 1.33 (H) 0.73 - 1.18 mg/dL    Calcium Level Total 8.6 8.4 - 10.2 mg/dL    Protein Total 5.9 (L) 6.4 - 8.3 gm/dL    Albumin Level 3.5 3.5 - 5.0 g/dL    Globulin 2.4 2.4 - 3.5 gm/dL    Albumin/Globulin Ratio 1.5 1.1 - 2.0 ratio    Bilirubin Total 0.6 <=1.5 mg/dL    Alkaline Phosphatase 59 40 - 150 unit/L    Alanine Aminotransferase 59 (H) 0 - 55 unit/L    Aspartate Aminotransferase 41 (H) 5 - 34 unit/L    eGFR >60 mls/min/1.73/m2   CBC with Differential    Collection Time: 12/29/23  4:28 AM   Result Value Ref Range    WBC 26.83 (H) 4.50 - 11.50 x10(3)/mcL    RBC 4.57 (L) 4.70 - 6.10 x10(6)/mcL    Hgb 14.0 14.0 - 18.0 g/dL    Hct 43.2 42.0 - 52.0 %    MCV 94.5 (H) 80.0 - 94.0 fL    MCH 30.6 27.0 - 31.0 pg    MCHC 32.4 (L) 33.0 - 36.0 g/dL    RDW 14.1 11.5 - 17.0 %    Platelet 300 130 - 400 x10(3)/mcL    MPV 10.3 7.4 - 10.4 fL    NRBC% 0.0 %   CK    Collection Time: 12/29/23  4:28 AM   Result Value Ref Range    Creatine Kinase 359 (H) 30 - 200 U/L   Manual Differential    Collection Time: 12/29/23  4:28 AM   Result Value Ref Range    WBC 26.83 x10(3)/mcL    Neutrophils % 94 %    Lymphs % 3 %    Monocytes % 3 %     Neutrophils Abs 25.2202 (H) 2.1 - 9.2 x10(3)/mcL    Lymphs Abs 0.8049 0.6 - 4.6 x10(3)/mcL    Monocytes Abs 0.8049 0.1 - 1.3 x10(3)/mcL    Platelets Normal Normal, Adequate    RBC Morph Normal Normal   Echo    Collection Time: 12/29/23  8:25 AM   Result Value Ref Range    BSA 2.32 m2    King's Biplane MOD Ejection Fraction 51 %    LVIDd 5.17 3.5 - 6.0 cm    LV Systolic Volume 24.80 mL    LV Systolic Volume Index 11.1 mL/m2    LVIDs 2.61 2.1 - 4.0 cm    LV Diastolic Volume 128.00 mL    LV Diastolic Volume Index 57.14 mL/m2    IVS 1.09 0.6 - 1.1 cm    FS 50 (A) 28 - 44 %    Left Ventricle Relative Wall Thickness 0.43 cm    Posterior Wall 1.11 (A) 0.6 - 1.1 cm    LV mass 218.69 g    LV Mass Index 98 g/m2    MV Peak E Fei 1.02 m/s    TDI LATERAL 0.18 m/s    TDI SEPTAL 0.11 m/s    E/E' ratio 7.03 m/s    MV Peak A Fei 0.80 m/s    E/A ratio 1.28     E wave deceleration time 193.00 msec    LV SEPTAL E/E' RATIO 9.27 m/s    LV LATERAL E/E' RATIO 5.67 m/s    LVOT peak fei 1.33 m/s    Left Ventricular Outflow Tract Mean Velocity 0.91 cm/s    Left Ventricular Outflow Tract Mean Gradient 4.00 mmHg    RVDD 3.90 cm    TAPSE 1.80 cm    LA size 3.40 cm    LA volume (mod) 42.70 cm3    LA Volume Index (Mod) 19.1 mL/m2    AV mean gradient 6 mmHg    AV peak gradient 12 mmHg    Ao peak fei 1.71 m/s    Ao VTI 32.70 cm    LVOT peak VTI 21.80 cm    AV Velocity Ratio 0.78     AV index (prosthetic) 0.67     PV PEAK VELOCITY 1.51 m/s    PV peak gradient 9 mmHg    Mean e' 0.15 m/s    ZLVIDS -4.91     ZLVIDD -4.38    Vancomycin, Random    Collection Time: 12/29/23  9:33 AM   Result Value Ref Range    Vanc Lvl Random 11.0 (L) 15.0 - 20.0 ug/ml   Troponin I    Collection Time: 12/29/23  9:33 AM   Result Value Ref Range    Troponin-I 0.419 (H) 0.000 - 0.045 ng/mL   HIV 1/2 Ag/Ab (4th Gen)    Collection Time: 12/29/23  9:33 AM   Result Value Ref Range    HIV Nonreactive Nonreactive   Hepatitis Panel, Acute    Collection Time: 12/29/23  9:33 AM    Result Value Ref Range    Hepatitis A IgM Nonreactive Nonreactive    Hepatitis B Core IgM Nonreactive Nonreactive    Hepatitis B Surface Antigen Nonreactive Nonreactive    Hep C Ab Interp Nonreactive Nonreactive   Respiratory Panel    Collection Time: 12/29/23 11:32 AM   Result Value Ref Range    Adenovirus Not Detected Not Detected    Coronavirus 229E Not Detected Not Detected    Coronavirus HKU1 Not Detected Not Detected    Coronavirus NL63 Not Detected Not Detected    Coronavirus OC43 PCR, Common Cold Virus Not Detected Not Detected    Human Metapneumovirus Not Detected Not Detected    Parainfluenza Virus 1 Not Detected Not Detected    Parainfluenza Virus 2 Not Detected Not Detected    Parainfluenza Virus 3 Not Detected Not Detected    Parainfluenza Virus 4 Not Detected Not Detected    Bordetella pertussis (ptxP) Not Detected Not Detected    Chlamydia pneumoniae Not Detected Not Detected    Mycoplasma pneumoniae Not Detected Not Detected    Human Rhinovirus/Enterovirus Detected (A) Not Detected    Bordetella parapertussis (CZ0415) Not Detected Not Detected   POCT glucose    Collection Time: 12/29/23 12:15 PM   Result Value Ref Range    POCT Glucose 86 70 - 110 mg/dL   POCT glucose    Collection Time: 12/29/23  4:56 PM   Result Value Ref Range    POCT Glucose 91 70 - 110 mg/dL   Comprehensive Metabolic Panel    Collection Time: 12/30/23  9:58 AM   Result Value Ref Range    Sodium Level 139 136 - 145 mmol/L    Potassium Level 4.2 3.5 - 5.1 mmol/L    Chloride 108 (H) 98 - 107 mmol/L    Carbon Dioxide 24 22 - 29 mmol/L    Glucose Level 119 (H) 74 - 100 mg/dL    Blood Urea Nitrogen 7.5 (L) 8.9 - 20.6 mg/dL    Creatinine 1.06 0.73 - 1.18 mg/dL    Calcium Level Total 8.3 (L) 8.4 - 10.2 mg/dL    Protein Total 5.5 (L) 6.4 - 8.3 gm/dL    Albumin Level 3.0 (L) 3.5 - 5.0 g/dL    Globulin 2.5 2.4 - 3.5 gm/dL    Albumin/Globulin Ratio 1.2 1.1 - 2.0 ratio    Bilirubin Total 0.5 <=1.5 mg/dL    Alkaline Phosphatase 47 40 - 150  unit/L    Alanine Aminotransferase 32 0 - 55 unit/L    Aspartate Aminotransferase 19 5 - 34 unit/L    eGFR >60 mls/min/1.73/m2   CBC with Differential    Collection Time: 12/30/23  9:58 AM   Result Value Ref Range    WBC 10.96 4.50 - 11.50 x10(3)/mcL    RBC 4.10 (L) 4.70 - 6.10 x10(6)/mcL    Hgb 12.7 (L) 14.0 - 18.0 g/dL    Hct 39.0 (L) 42.0 - 52.0 %    MCV 95.1 (H) 80.0 - 94.0 fL    MCH 31.0 27.0 - 31.0 pg    MCHC 32.6 (L) 33.0 - 36.0 g/dL    RDW 14.3 11.5 - 17.0 %    Platelet 250 130 - 400 x10(3)/mcL    MPV 10.2 7.4 - 10.4 fL    Neut % 75.8 %    Lymph % 15.6 %    Mono % 5.6 %    Eos % 2.5 %    Basophil % 0.3 %    Lymph # 1.71 0.6 - 4.6 x10(3)/mcL    Neut # 8.32 2.1 - 9.2 x10(3)/mcL    Mono # 0.61 0.1 - 1.3 x10(3)/mcL    Eos # 0.27 0 - 0.9 x10(3)/mcL    Baso # 0.03 <=0.2 x10(3)/mcL    IG# 0.02 0 - 0.04 x10(3)/mcL    IG% 0.2 %    NRBC% 0.0 %   Magnesium    Collection Time: 12/30/23  9:58 AM   Result Value Ref Range    Magnesium Level 1.90 1.60 - 2.60 mg/dL   Phosphorus    Collection Time: 12/30/23  9:58 AM   Result Value Ref Range    Phosphorus Level 1.2 (L) 2.3 - 4.7 mg/dL   Troponin I    Collection Time: 12/30/23  9:58 AM   Result Value Ref Range    Troponin-I 0.182 (H) 0.000 - 0.045 ng/mL       Significant Imaging:  Imaging Results              X-Ray Chest AP Portable (Final result)  Result time 12/28/23 16:11:41      Final result by Henrique Hawley MD (12/28/23 16:11:41)                   Impression:      Findings suggest pulmonary edema.      Electronically signed by: Henrique Hawley  Date:    12/28/2023  Time:    16:11               Narrative:    EXAMINATION:  XR CHEST AP PORTABLE    CLINICAL HISTORY:  unresponsive episode;    TECHNIQUE:  One view    COMPARISON:  None available.    FINDINGS:  Cardiopericardial silhouette is within normal limits.  Bilateral lungs are remarkable for ground-glass and reticulonodular opacities mostly about the central location and suggest moderate pulmonary edema.  No focally dense  consolidation.  No significant fluid within the pleural spaces.  No pneumothorax.                                       CT Head Without Contrast (Final result)  Result time 12/28/23 15:56:58      Final result by Giovanny Acevedo MD (12/28/23 15:56:58)                   Narrative:    EXAMINATION  CT HEAD WITHOUT CONTRAST    CLINICAL HISTORY  Mental status change, unknown cause;    TECHNIQUE  Axial non-contrast CT images of the head were acquired and multiplanar reconstructions accomplished by a CT technologist at a separate workstation, pushed to PACS for physician review.    COMPARISON  None available at the time of the initial interpretation.    FINDINGS  Images were reviewed in subdural, brain, soft tissue, and bone windows.    Exam quality: Motion/streak artifact limits assessment of the posterior fossa.    Hemorrhage: No evidence of acute hyperattenuating blood products.    Parenchyma: No discrete mass, localized mass-effect, or CT evidence of an acute territorial cortical-based ischemic insult. Gray-white differentiation is preserved.    Midline shift: None.    CSF spaces: Normal ventricle size and configuration. No extra-axial masses or expansile fluid collections.    Vasculature: No hyperdense artery identified. No abnormal densities within the dural sinuses.    Other findings: No abnormalities of the scalp or subjacent osseous structures. Mastoids are well aerated. No focal abnormality of the sella. The included facial structures are unremarkable.    IMPRESSION  No CT-evident acute intracranial abnormality.    RADIATION DOSE  Automated tube current modulation, weight-based exposure dosing, and/or iterative reconstruction technique utilized to reach lowest reasonably achievable exposure rate.    DLP: 2353 mGy*cm      Electronically signed by: Giovanny Acevedo  Date:    12/28/2023  Time:    15:56                                    EKG:        Telemetry:  SR    Physical Exam  Vitals and nursing note reviewed.    Constitutional:       General: He is not in acute distress.  HENT:      Head: Normocephalic.      Mouth/Throat:      Mouth: Mucous membranes are moist.      Pharynx: Oropharynx is clear.   Cardiovascular:      Rate and Rhythm: Normal rate and regular rhythm.      Heart sounds: Normal heart sounds.   Pulmonary:      Effort: Pulmonary effort is normal. No respiratory distress.      Breath sounds: Normal breath sounds.   Abdominal:      Palpations: Abdomen is soft.   Skin:     General: Skin is warm and dry.   Neurological:      Comments: Drowsy       Home Medications:   No current facility-administered medications on file prior to encounter.     No current outpatient medications on file prior to encounter.     Current Inpatient Medications:    Current Facility-Administered Medications:     0.9%  NaCl infusion, , Intravenous, Continuous, Alirio Zhong MD, Last Rate: 125 mL/hr at 12/29/23 2022, New Bag at 12/29/23 2022    albuterol-ipratropium 2.5 mg-0.5 mg/3 mL nebulizer solution 3 mL, 3 mL, Nebulization, Q8H, Angel Muller MD, 3 mL at 12/30/23 0755    dextrose 10% bolus 125 mL 125 mL, 12.5 g, Intravenous, PRN, Alirio Zhong MD    dextrose 10% bolus 250 mL 250 mL, 25 g, Intravenous, PRN, Alirio Zhong MD    diphenhydrAMINE capsule 50 mg, 50 mg, Oral, Q4H PRN, Koko Anna MD    diphenhydrAMINE injection 50 mg, 50 mg, Intramuscular, Q4H PRN, Koko Anna MD    enoxaparin injection 40 mg, 40 mg, Subcutaneous, Daily, Alirio Zhong MD, 40 mg at 12/29/23 1652    glucagon (human recombinant) injection 1 mg, 1 mg, Intramuscular, PRN, Alirio Zhong MD    glucose chewable tablet 16 g, 16 g, Oral, PRN, Alirio Zhong MD    glucose chewable tablet 24 g, 24 g, Oral, PRN, Alirio Zhong MD    haloperidol lactate injection 5 mg, 5 mg, Intramuscular, Q4H PRN, Koko Anna MD    haloperidoL tablet 5 mg, 5 mg, Oral, Q4H PRN, Koko Anna MD     LORazepam tablet 2 mg, 2 mg, Oral, Q4H PRN, Koko Anna MD    melatonin tablet 6 mg, 6 mg, Oral, Nightly PRN, Alirio Zhong MD    naloxone 0.4 mg/mL injection 0.4 mg, 0.4 mg, Intravenous, PRN, Koko Anna MD    naloxone 0.4 mg/mL injection 0.4 mg, 0.4 mg, Intravenous, PRN, Alirio Zhong MD    piperacillin-tazobactam (ZOSYN) 4.5 g in dextrose 5 % in water (D5W) 100 mL IVPB (MB+), 4.5 g, Intravenous, Q8H, Alirio Zhong MD, Stopped at 12/30/23 1003    sodium chloride 0.9% flush 10 mL, 10 mL, Intravenous, PRN, Alirio Zhong MD    VTE Risk Mitigation (From admission, onward)           Ordered     enoxaparin injection 40 mg  Daily         12/29/23 0402     IP VTE HIGH RISK PATIENT  Once         12/29/23 0400     Place sequential compression device  Until discontinued         12/29/23 0400                  Assessment:   NSTEMI- Type II in the Setting of Drug Overdose    - Denies Angina/SOB    - LV Function Intact  Toxic Encephalopathy  Acute Kidney Injury  Acute Hypoxic Respiratory Failure Likely Secondary to Aspiration    - Ground Glass Opacities on Chest XR  Human Rhinovirus    - On Droplet Precautions  No Known History of GI Bleed    Plan:   Continue Medical Management as per Primary Team  No Indication for Ischemic Evaluation at this time. Patient denies Ischemic Symptoms. Echocardiogram with intact LV Function and no obvious WMA.   Strongly advise complete cessation of all illicit substances including nicotine products.   Will sign off. Call if needed.    Thank you for your consult.     Avel Castrejon, LUANA  Cardiology  [facility] - [facility] Tele[facility]  12/30/2023 12:01 PM    I have seen the patient, reviewed the Nurse Practitioner's note, assessment and plan. I have personally interviewed and examined the patient at bedside and agree with the findings. Medical decision making listed above were done under my guidance.    Physical exam:  Cardiovascular  system: regular rhythm, no murmur.  Lungs: CTAB.  Extremities: No leg edema.    Plan:  Needs lifestyle changes

## 2023-12-30 NOTE — PROGRESS NOTES
[facility] Hospital Medicine Progress Note        Chief Complaint: Inpatient Follow-up for     HPI:   28-year-old male brought in after he was found unresponsive at his hotel, and placed on a PEC for altered mentation on arrival.  Received Narcan in route with some improvement in his mentation.  At the time of my assessment very difficult historian, but states that he took a pill that someone gave him and he was unsure of the contents.  He adamantly denies IV drug use.     Workup in the ER significant for a leukocytosis of 26 K, acute kidney injury with a creatinine of 2, hypoglycemia requiring 2 D10 boluses, an elevated lactic acid that has since normalized, mildly elevated troponin, and a UDS positive for amphetamines fentanyl and cannabinoids.  Chest x-ray suggested pulmonary edema based on bilateral ground-glass reticulonodular opacities, and CT head showed no acute process. Psychiatry consulted given prior Psychiatric comorbidities/history & current PEC status per ER note. Will await clearance from Psych before rescinding PEC. Troponins were elevated & CIS consulted; Echo ordered which showed EF 55-60%, normal diastolic function. CIS signed off.    Interval Hx:   Today, Mr Padilla stated he was doing well and had no new complaints. Awaiting labs. Will follow Psych recommendations.    Case was discussed with patient's nurse on the floor.    Objective/physical exam:  General: In no acute distress, afebrile  Chest: Clear to auscultation bilaterally  Heart: RRR, +S1, S2, no appreciable murmur  Abdomen: Soft, nontender, BS +  MSK: Warm, no lower extremity edema, no clubbing or cyanosis  Neurologic: Alert and oriented x4, Cranial nerve II-XII intact, Strength 5/5 in all 4 extremities    VITAL SIGNS: 24 HRS MIN & MAX LAST   Temp  Min: 97.2 °F (36.2 °C)  Max: 98.3 °F (36.8 °C) 98.1 °F (36.7 °C)   BP  Min: 103/57  Max: 118/64 108/62   Pulse  Min: 76  Max: 105  79   Resp  Min: 18  Max: 22 18    SpO2  Min: 87 %  Max: 96 % 96 %     I have reviewed the following labs:  Recent Labs   Lab 12/28/23  1514 12/29/23  0428   WBC 26.42  26.42* 26.83  26.83*   RBC 5.41 4.57*   HGB 16.7 14.0   HCT 53.0* 43.2   MCV 98.0* 94.5*   MCH 30.9 30.6   MCHC 31.5* 32.4*   RDW 14.3 14.1    300   MPV 10.4 10.3     Recent Labs   Lab 12/28/23  1514 12/29/23  0428    136   K 4.6 4.4   CO2 21* 21*   BUN 16.6 13.5   CREATININE 2.08* 1.33*   CALCIUM 9.1 8.6   MG 2.40  --    ALBUMIN 4.1 3.5   ALKPHOS 81 59   ALT 84* 59*   AST 87* 41*   BILITOT 0.3 0.6     Assessment/Plan:  Overdose, likely fentanyl, intention unknown  Toxic encephalopathy secondary to above   Acute kidney injury likely 2/2 IV depletion  Mildly elevated troponin likely 2/2 Type II NSTEMI  Hypoxemic Respiratory Failure 2/2 RSV Pneumonitis vs Aspiration Pneumonitis    Patient continues to be admitted for close monitoring   Reporting no new complaints  Cardiology was consulted for troponinemia; signed off  Continues to be on IV Zosyn t.i.d.   Continued on DuoNebs q.6 hours  Psychiatry consulted and patient was PEC'd in the ER; will await recommendations   Patient continues to be under PEC until Psychiatry clears patient  Will continue monitoring labs symptoms    VTE prophylaxis: Lovenox    Patient condition:  Stable    Anticipated discharge and Disposition:     Pending Psych recs    All diagnosis and differential diagnosis have been reviewed; assessment and plan has been documented; I have personally reviewed the labs and test results that are presently available; I have reviewed the patients medication list; I have reviewed the consulting providers response and recommendations. I have reviewed or attempted to review medical records based upon their availability    All of the patient's questions have been  addressed and answered. Patient's is agreeable to the above stated plan. I will continue to monitor closely and make adjustments to medical management as  needed.  _____________________________________________________________________    Radiology:  I have personally reviewed the following imaging and agree with the radiologist.     Echo    Left Ventricle: The left ventricle is normal in size. Normal wall   thickness. Normal wall motion. There is normal systolic function with a   visually estimated ejection fraction of 55 - 60%. There is normal   diastolic function.    Right Ventricle: Normal right ventricular cavity size. Systolic   function is normal. TAPSE is 1.80 cm.    No significant valvular abnormalities noted in this study.      Christian Parker MD   12/30/2023

## 2023-12-31 LAB
ANION GAP SERPL CALC-SCNC: 8 MEQ/L
BUN SERPL-MCNC: 6.1 MG/DL (ref 8.9–20.6)
CALCIUM SERPL-MCNC: 9 MG/DL (ref 8.4–10.2)
CHLORIDE SERPL-SCNC: 108 MMOL/L (ref 98–107)
CO2 SERPL-SCNC: 22 MMOL/L (ref 22–29)
CREAT SERPL-MCNC: 0.93 MG/DL (ref 0.73–1.18)
CREAT/UREA NIT SERPL: 7
GFR SERPLBLD CREATININE-BSD FMLA CKD-EPI: >60 MLS/MIN/1.73/M2
GLUCOSE SERPL-MCNC: 119 MG/DL (ref 74–100)
MAGNESIUM SERPL-MCNC: 1.9 MG/DL (ref 1.6–2.6)
PHOSPHATE SERPL-MCNC: 2.7 MG/DL (ref 2.3–4.7)
POTASSIUM SERPL-SCNC: 4.1 MMOL/L (ref 3.5–5.1)
SODIUM SERPL-SCNC: 138 MMOL/L (ref 136–145)

## 2023-12-31 PROCEDURE — 96372 THER/PROPH/DIAG INJ SC/IM: CPT | Performed by: INTERNAL MEDICINE

## 2023-12-31 PROCEDURE — 25000003 PHARM REV CODE 250: Performed by: INTERNAL MEDICINE

## 2023-12-31 PROCEDURE — 99900035 HC TECH TIME PER 15 MIN (STAT)

## 2023-12-31 PROCEDURE — 96366 THER/PROPH/DIAG IV INF ADDON: CPT

## 2023-12-31 PROCEDURE — 63600175 PHARM REV CODE 636 W HCPCS: Performed by: INTERNAL MEDICINE

## 2023-12-31 PROCEDURE — G0378 HOSPITAL OBSERVATION PER HR: HCPCS

## 2023-12-31 PROCEDURE — 94640 AIRWAY INHALATION TREATMENT: CPT

## 2023-12-31 PROCEDURE — 84100 ASSAY OF PHOSPHORUS: CPT | Performed by: STUDENT IN AN ORGANIZED HEALTH CARE EDUCATION/TRAINING PROGRAM

## 2023-12-31 PROCEDURE — 25000003 PHARM REV CODE 250: Performed by: NURSE PRACTITIONER

## 2023-12-31 PROCEDURE — 94761 N-INVAS EAR/PLS OXIMETRY MLT: CPT

## 2023-12-31 PROCEDURE — 80048 BASIC METABOLIC PNL TOTAL CA: CPT | Performed by: STUDENT IN AN ORGANIZED HEALTH CARE EDUCATION/TRAINING PROGRAM

## 2023-12-31 PROCEDURE — 25000003 PHARM REV CODE 250: Performed by: STUDENT IN AN ORGANIZED HEALTH CARE EDUCATION/TRAINING PROGRAM

## 2023-12-31 PROCEDURE — 96361 HYDRATE IV INFUSION ADD-ON: CPT

## 2023-12-31 PROCEDURE — 83735 ASSAY OF MAGNESIUM: CPT | Performed by: STUDENT IN AN ORGANIZED HEALTH CARE EDUCATION/TRAINING PROGRAM

## 2023-12-31 PROCEDURE — 25000242 PHARM REV CODE 250 ALT 637 W/ HCPCS: Performed by: INTERNAL MEDICINE

## 2023-12-31 RX ORDER — SODIUM,POTASSIUM PHOSPHATES 280-250MG
1 POWDER IN PACKET (EA) ORAL ONCE
Status: COMPLETED | OUTPATIENT
Start: 2023-12-31 | End: 2023-12-31

## 2023-12-31 RX ORDER — RISPERIDONE 0.5 MG/1
0.5 TABLET ORAL 2 TIMES DAILY
Status: DISCONTINUED | OUTPATIENT
Start: 2023-12-31 | End: 2024-01-01 | Stop reason: HOSPADM

## 2023-12-31 RX ADMIN — PIPERACILLIN AND TAZOBACTAM 4.5 G: 4; .5 INJECTION, POWDER, FOR SOLUTION INTRAVENOUS at 08:12

## 2023-12-31 RX ADMIN — IPRATROPIUM BROMIDE AND ALBUTEROL SULFATE 3 ML: 2.5; .5 SOLUTION RESPIRATORY (INHALATION) at 08:12

## 2023-12-31 RX ADMIN — RISPERIDONE 0.5 MG: 0.5 TABLET ORAL at 06:12

## 2023-12-31 RX ADMIN — SODIUM CHLORIDE: 9 INJECTION, SOLUTION INTRAVENOUS at 10:12

## 2023-12-31 RX ADMIN — PIPERACILLIN AND TAZOBACTAM 4.5 G: 4; .5 INJECTION, POWDER, FOR SOLUTION INTRAVENOUS at 04:12

## 2023-12-31 RX ADMIN — POTASSIUM & SODIUM PHOSPHATES POWDER PACK 280-160-250 MG 1 PACKET: 280-160-250 PACK at 06:12

## 2023-12-31 RX ADMIN — PIPERACILLIN AND TAZOBACTAM 4.5 G: 4; .5 INJECTION, POWDER, FOR SOLUTION INTRAVENOUS at 01:12

## 2023-12-31 RX ADMIN — ENOXAPARIN SODIUM 40 MG: 40 INJECTION SUBCUTANEOUS at 06:12

## 2023-12-31 RX ADMIN — IPRATROPIUM BROMIDE AND ALBUTEROL SULFATE 3 ML: 2.5; .5 SOLUTION RESPIRATORY (INHALATION) at 11:12

## 2023-12-31 NOTE — PSYCH EVALUATION
"Subjective: "I don't know. I just woke up in the ambulance."    Patient is a 28 y.o. male seen for evaluation for for AMS, sitter at BS. 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 Reports taking a "muscle relaxer"  He got from someone, later on determined this to supposed to have been a Lortab and smoked meth. Next he recalls being picked up by ambulance and brought to the hospital, unable to piece together other events surrounding hospitalization. Was naracaned en route to hospital with some mentation improvement. UDS (+) amphetamines/THC/fentanyl. He acknowledges smoking weed daily, denies ongoing opiate abuse. Hx inpt rehab for substance abuse but he is vague about details and when/why he want, does reports it "was somewhere in [place]". Noncompliant with f/u and treatment afterwards. Never , no children, 11th grade education, no  hx, (+) legal hx. Smokes 1ppd cigarettes. Limited historian. When asked collateral information and hx psych treatment he responds "I don't know you gonna have to ask my dad about all that, he knows".     Workup in the ER significant for a leukocytosis of 26 K, acute kidney injury with a creatinine of 2, hypoglycemia requiring 2 D10 boluses, an elevated lactic acid that has since normalized, mildly elevated troponin, and a UDS positive for amphetamines fentanyl and cannabinoids.  Chest x-ray suggested pulmonary edema based on bilateral ground-glass reticulonodular opacities, and CT head showed no acute process.       Patient Active Problem List    Diagnosis Date Noted    Unresponsive episode 12/29/2023     No past medical history on file.   No past surgical history on file.   No medications prior to admission.     Review of patient's allergies indicates:  Not on File   Social History     Tobacco Use    Smoking status: Not on file    Smokeless tobacco: Not on file "   Substance Use Topics    Alcohol use: Not on file        Psychiatric Review Of Systems:  sleep: yes  appetite changes: yes  weight changes: no  energy/anergy: yes  interest/pleasure/anhedonia: yes  somatic symptoms: yes  libido: no  anxiety/panic: yes  guilty/hopeless: no  S.I.B.s/risky behavior: yes  any drugs: yes  alcohol: no       Objective:  Vital signs:  Temp:  [98.2 °F (36.8 °C)-98.5 °F (36.9 °C)] 98.2 °F (36.8 °C)  Pulse:  [60-96] 60  Resp:  [16-20] 16  SpO2:  [94 %-97 %] 97 %  BP: (115-135)/(62-77) 116/64        Mental Status Evaluation:  Appearance:  lying in bed, overweight, hospital gown   Behavior:  reluctant to participate, restless and fidgety , eye contact minimal   Speech:  slowed, delayed, minimal   Mood:  irritable   Affect:  blunted, irritable   Thought Process:  blocked, circumstantial, loose associations   Thought Content:  normal, no suicidality, no homicidality, delusions, or paranoia, questionable   Sensorium:  person, place   Cognition:  memory > able to remember recent events- No, able to remember remote events- No and limited   Insight:  poor   Judgment:  poor     Assessment/Plan:  Axis I: Organic Affective Syndrome, See current hospital problem list, Substance Abuse, Substance Induced Mood Disorder, and Opiate abuse/Amphetamine Abuse/Cannabis abuse  Axis II: Deferred  Axis III: No past medical history on file.  Axis IV: occupational problems, other psychosocial or environmental problems, problems related to social environment, problems with access to health care services, and problems with primary support group  Axis V: 0 Inadequate information      Plan:   1.) Risperdal 0.5mg po BID, COWS BID x 72 hours, monitor for opiate withdrawals (vague on consistency and use of opiates)  2.) inpt psych/rehab after medically cleared  3.) psych cont to follow until placed

## 2023-12-31 NOTE — PROGRESS NOTES
[facility] Hospital Medicine Progress Note        Chief Complaint: Inpatient Follow-up for     HPI:   28-year-old male brought in after he was found unresponsive at his hotel, and placed on a PEC for altered mentation on arrival.  Received Narcan in route with some improvement in his mentation.  At the time of my assessment very difficult historian, but states that he took a pill that someone gave him and he was unsure of the contents.  He adamantly denies IV drug use.     Workup in the ER significant for a leukocytosis of 26 K, acute kidney injury with a creatinine of 2, hypoglycemia requiring 2 D10 boluses, an elevated lactic acid that has since normalized, mildly elevated troponin, and a UDS positive for amphetamines fentanyl and cannabinoids.  Chest x-ray suggested pulmonary edema based on bilateral ground-glass reticulonodular opacities, and CT head showed no acute process. Psychiatry consulted given prior Psychiatric comorbidities/history & current PEC status per ER note. Will await clearance from Psych before rescinding PEC. Troponins were elevated & CIS consulted; Echo ordered which showed EF 55-60%, normal diastolic function. CIS signed off with no plans for ischemic evaluation.    Interval Hx:   Today, Mr Padilla stated he was doing well and had no new complaints. Will give K Phos PO. Awaiting Psych recommendations regarding need for inpatient Psych treatment. Will DC IV NS.    Case was discussed with patient's nurse on the floor.    Objective/physical exam:  General: In no acute distress, afebrile  Chest: Clear to auscultation bilaterally  Heart: RRR, +S1, S2, no appreciable murmur  Abdomen: Soft, nontender, BS +  MSK: Warm, no lower extremity edema, no clubbing or cyanosis  Neurologic: Alert and oriented x4, Cranial nerve II-XII intact, Strength 5/5 in all 4 extremities    VITAL SIGNS: 24 HRS MIN & MAX LAST   Temp  Min: 97.8 °F (36.6 °C)  Max: 98.5 °F (36.9 °C) 98.4 °F (36.9  °C)   BP  Min: 115/67  Max: 135/74 115/67   Pulse  Min: 74  Max: 96  86   Resp  Min: 18  Max: 20 20   SpO2  Min: 94 %  Max: 98 % 96 %     I have reviewed the following labs:  Recent Labs   Lab 12/28/23  1514 12/29/23  0428 12/30/23  0958   WBC 26.42  26.42* 26.83  26.83* 10.96   RBC 5.41 4.57* 4.10*   HGB 16.7 14.0 12.7*   HCT 53.0* 43.2 39.0*   MCV 98.0* 94.5* 95.1*   MCH 30.9 30.6 31.0   MCHC 31.5* 32.4* 32.6*   RDW 14.3 14.1 14.3    300 250   MPV 10.4 10.3 10.2       Recent Labs   Lab 12/28/23  1514 12/29/23  0428 12/30/23  0958    136 139   K 4.6 4.4 4.2   CO2 21* 21* 24   BUN 16.6 13.5 7.5*   CREATININE 2.08* 1.33* 1.06   CALCIUM 9.1 8.6 8.3*   MG 2.40  --  1.90   ALBUMIN 4.1 3.5 3.0*   ALKPHOS 81 59 47   ALT 84* 59* 32   AST 87* 41* 19   BILITOT 0.3 0.6 0.5       Assessment/Plan:  Overdose, likely fentanyl, intention unknown  Toxic encephalopathy secondary to above   Acute kidney injury likely 2/2 IV depletion  Mildly elevated troponin likely 2/2 Type II NSTEMI  Hypoxemic Respiratory Failure 2/2 RSV Pneumonitis vs Aspiration Pneumonitis  Hypophosphatemia    Patient continues to be admitted for close monitoring   Reporting no new complaints  Cardiology was consulted for troponinemia; signed off with no plans for ischemic workup  Continues to be on IV Zosyn t.i.d.   Will DC IVF as BUN/Cr are normal now at 7.5/1.06  Continued on DuoNebs q.6 hours  Will give Neutra-Phos once  Psychiatry consulted and patient was PEC'd in the ER; will await recommendations regarding need for inpatient treatment  Patient continues to be under PEC until Psychiatry clears patient  Will continue monitoring labs symptoms    VTE prophylaxis: Lovenox    Patient condition:  Stable    Anticipated discharge and Disposition:     Pending Psych recs    All diagnosis and differential diagnosis have been reviewed; assessment and plan has been documented; I have personally reviewed the labs and test results that are presently  available; I have reviewed the patients medication list; I have reviewed the consulting providers response and recommendations. I have reviewed or attempted to review medical records based upon their availability    All of the patient's questions have been  addressed and answered. Patient's is agreeable to the above stated plan. I will continue to monitor closely and make adjustments to medical management as needed.  _____________________________________________________________________    Radiology:  I have personally reviewed the following imaging and agree with the radiologist.     Echo    Left Ventricle: The left ventricle is normal in size. Normal wall   thickness. Normal wall motion. There is normal systolic function with a   visually estimated ejection fraction of 55 - 60%. There is normal   diastolic function.    Right Ventricle: Normal right ventricular cavity size. Systolic   function is normal. TAPSE is 1.80 cm.    No significant valvular abnormalities noted in this study.      Christian Parker MD   12/31/2023

## 2024-01-01 VITALS
BODY MASS INDEX: 35.98 KG/M2 | HEIGHT: 69 IN | HEART RATE: 59 BPM | DIASTOLIC BLOOD PRESSURE: 64 MMHG | SYSTOLIC BLOOD PRESSURE: 116 MMHG | WEIGHT: 242.94 LBS | TEMPERATURE: 98 F | RESPIRATION RATE: 18 BRPM | OXYGEN SATURATION: 96 %

## 2024-01-01 LAB
ALBUMIN SERPL-MCNC: 3.3 G/DL (ref 3.5–5)
ALBUMIN/GLOB SERPL: 1.1 RATIO (ref 1.1–2)
ALP SERPL-CCNC: 53 UNIT/L (ref 40–150)
ALT SERPL-CCNC: 31 UNIT/L (ref 0–55)
AST SERPL-CCNC: 22 UNIT/L (ref 5–34)
BILIRUB SERPL-MCNC: 0.4 MG/DL
BUN SERPL-MCNC: 6.9 MG/DL (ref 8.9–20.6)
CALCIUM SERPL-MCNC: 8.9 MG/DL (ref 8.4–10.2)
CHLORIDE SERPL-SCNC: 108 MMOL/L (ref 98–107)
CO2 SERPL-SCNC: 21 MMOL/L (ref 22–29)
CREAT SERPL-MCNC: 0.97 MG/DL (ref 0.73–1.18)
GFR SERPLBLD CREATININE-BSD FMLA CKD-EPI: >60 MLS/MIN/1.73/M2
GLOBULIN SER-MCNC: 3.1 GM/DL (ref 2.4–3.5)
GLUCOSE SERPL-MCNC: 78 MG/DL (ref 74–100)
POTASSIUM SERPL-SCNC: 4.6 MMOL/L (ref 3.5–5.1)
PROT SERPL-MCNC: 6.4 GM/DL (ref 6.4–8.3)
SODIUM SERPL-SCNC: 137 MMOL/L (ref 136–145)

## 2024-01-01 PROCEDURE — 25000003 PHARM REV CODE 250: Performed by: INTERNAL MEDICINE

## 2024-01-01 PROCEDURE — G0378 HOSPITAL OBSERVATION PER HR: HCPCS

## 2024-01-01 PROCEDURE — 96366 THER/PROPH/DIAG IV INF ADDON: CPT

## 2024-01-01 PROCEDURE — 25000242 PHARM REV CODE 250 ALT 637 W/ HCPCS: Performed by: INTERNAL MEDICINE

## 2024-01-01 PROCEDURE — 94640 AIRWAY INHALATION TREATMENT: CPT

## 2024-01-01 PROCEDURE — 63600175 PHARM REV CODE 636 W HCPCS: Performed by: INTERNAL MEDICINE

## 2024-01-01 PROCEDURE — 25000003 PHARM REV CODE 250: Performed by: NURSE PRACTITIONER

## 2024-01-01 PROCEDURE — 94761 N-INVAS EAR/PLS OXIMETRY MLT: CPT

## 2024-01-01 PROCEDURE — 80053 COMPREHEN METABOLIC PANEL: CPT | Performed by: STUDENT IN AN ORGANIZED HEALTH CARE EDUCATION/TRAINING PROGRAM

## 2024-01-01 RX ORDER — RISPERIDONE 0.5 MG/1
0.5 TABLET ORAL 2 TIMES DAILY
Qty: 60 TABLET | Refills: 11 | Status: SHIPPED | OUTPATIENT
Start: 2024-01-01 | End: 2024-12-31

## 2024-01-01 RX ORDER — LEVOFLOXACIN 750 MG/1
750 TABLET ORAL DAILY
Qty: 3 TABLET | Refills: 0 | Status: SHIPPED | OUTPATIENT
Start: 2024-01-01 | End: 2024-01-04

## 2024-01-01 RX ADMIN — PIPERACILLIN AND TAZOBACTAM 4.5 G: 4; .5 INJECTION, POWDER, FOR SOLUTION INTRAVENOUS at 05:01

## 2024-01-01 RX ADMIN — RISPERIDONE 0.5 MG: 0.5 TABLET ORAL at 09:01

## 2024-01-01 RX ADMIN — IPRATROPIUM BROMIDE AND ALBUTEROL SULFATE 3 ML: 2.5; .5 SOLUTION RESPIRATORY (INHALATION) at 08:01

## 2024-01-01 NOTE — PLAN OF CARE
Problem: Adult Inpatient Plan of Care  Goal: Plan of Care Review  Outcome: Ongoing, Progressing  Goal: Patient-Specific Goal (Individualized)  Outcome: Ongoing, Progressing  Goal: Absence of Hospital-Acquired Illness or Injury  Outcome: Ongoing, Progressing  Goal: Optimal Comfort and Wellbeing  Outcome: Ongoing, Progressing  Goal: Readiness for Transition of Care  Outcome: Ongoing, Progressing     Problem: Fall Injury Risk  Goal: Absence of Fall and Fall-Related Injury  Outcome: Ongoing, Progressing     Problem: Infection  Goal: Absence of Infection Signs and Symptoms  Outcome: Ongoing, Progressing      TRANSFER - OUT REPORT:    Verbal report given to Elida on Otoniel Michaels  being transferred to [address] [place] [address] for routine progression of patient care       Report consisted of patient's Situation, Background, Assessment and   Recommendations(SBAR). Information from the following report(s) Nurse Handoff Report, Index, ED Encounter Summary, ED SBAR, Intake/Output, MAR, Recent Results, Med Rec Status, and Cardiac Rhythm nsr, previously svt  was reviewed with the receiving nurse. Francitas Fall Assessment:    Presents to emergency department  because of falls (Syncope, seizure, or loss of consciousness): Yes  Age > 79: No  Altered Mental Status, Intoxication with alcohol or substance confusion (Disorientation, impaired judgment, poor safety awaremess, or inability to follow instructions): No  Impaired Mobility: Ambulates or transfers with assistive devices or assistance; Unable to ambulate or transer.: No  Nursing Judgement: Yes          Lines:   Single Lumen Implantable Port 08/01/23 Right Subclavian (Active)       Peripheral IV 08/08/23 Right Antecubital (Active)   Site Assessment Clean, dry & intact 08/08/23 1636   Line Status Normal saline locked 08/08/23 1636   Phlebitis Assessment No symptoms 08/08/23 1636   Infiltration Assessment 0 08/08/23 1636       Peripheral IV 08/08/23 Left; Anterior Forearm (Active)   Site Assessment Clean, dry & intact 08/08/23 1719   Line Status Flushed 08/08/23 1719   Phlebitis Assessment No symptoms 08/08/23 1719   Infiltration Assessment 0 08/08/23 1719   Dressing Status Clean, dry & intact 08/08/23 1719   Dressing Type Transparent 08/08/23 1719        Opportunity for questions and clarification was provided.       Patient transported with:  Monitor and Registered Nurse           Edmar Gregory RN  08/09/23 9910

## 2024-01-01 NOTE — NURSING
Pt escorted with security and SPD to secure transport vehicle. Belongings checked with security, SPD, and patient at bedside.

## 2024-01-01 NOTE — NURSING
Report called to accepting facility [facility] in [place], [place]. SPD notified of need for transport. Father, Phan notified of transfer. All pt's questions answered. Verbalized understanding. Pt cooperative at this time. No thoughts/plans of HI or SI intentions at this time. Hospital security notified of patients transfer, will be ready to assist with transport to secured vehicle on SPD arrival.

## 2024-01-01 NOTE — PLAN OF CARE
CM was notified by patient's nurseSreedhar that patient is medically cleared for placement. CM contacted [facility] and spoke with Allison regarding initiating placement process. CM also provided pt's information and nurse's contact.      CM was notified by pt's nurseSreedhar of patient's acceptance at [facility] located at Address: [address] [place] [address] [place] [address]. Phone: [phone]. CM provided pt's nurse with a packet (original CEC, PEC, MAR and Face sheet). Following, CM contacted [facility] and spoke with Ro who provided contact to give report ([phone]). CM provided this information to pt's nurse via secure chat.

## 2024-01-03 LAB
BACTERIA BLD CULT: NORMAL
BACTERIA BLD CULT: NORMAL

## 2024-02-05 NOTE — DISCHARGE SUMMARY
[facility]  Hospital Medicine Discharge Summary    Admit Date: 12/28/2023  Discharge Date and Time: 01/01/2024  Admitting Physician: LUIS ALBERTO Team  Discharging Physician: Christian Parker MD.  Primary Care Physician: Jeannette, Primary Doctor  Consults: Hospital Medicine    Discharge Diagnoses:  Overdose, likely fentanyl, intention unknown  Toxic encephalopathy secondary to above   Acute kidney injury likely 2/2 IV depletion  Mildly elevated troponin likely 2/2 Type II NSTEMI  Hypoxemic Respiratory Failure 2/2 RSV Pneumonitis vs Aspiration Pneumonitis  Hypophosphatemia    Hospital Course:   28-year-old male brought in after he was found unresponsive at his hotel, and placed on a PEC for altered mentation on arrival.  Received Narcan in route with some improvement in his mentation.  At the time of my assessment very difficult historian, but states that he took a pill that someone gave him and he was unsure of the contents.  He adamantly denies IV drug use.     Workup in the ER significant for a leukocytosis of 26 K, acute kidney injury with a creatinine of 2, hypoglycemia requiring 2 D10 boluses, an elevated lactic acid that has since normalized, mildly elevated troponin, and a UDS positive for amphetamines fentanyl and cannabinoids.  Chest x-ray suggested pulmonary edema based on bilateral ground-glass reticulonodular opacities, and CT head showed no acute process. Psychiatry consulted given prior Psychiatric comorbidities/history & current PEC status per ER note. Will await clearance from Psych before rescinding PEC. Troponins were elevated & CIS consulted; Echo ordered which showed EF 55-60%, normal diastolic function. CIS signed off with no plans for ischemic evaluation. Psych recommended transfer to inpatient psych facility upon medical clearance.    Pt was seen and examined on the day of discharge. Mr Padilla stated he was doing well and had no new complaints. Plan for DC to inpatient psych facility  "today.     Vitals:  VITAL SIGNS: 24 HRS MIN & MAX LAST   No data recorded 98 °F (36.7 °C)   No data recorded 116/64   No data recorded  (!) 59   No data recorded 18   No data recorded 96 %       Physical Exam:  General: In no acute distress, afebrile  Chest: Clear to auscultation bilaterally  Heart: RRR, +S1, S2, no appreciable murmur  Abdomen: Soft, nontender, BS +  MSK: Warm, no lower extremity edema, no clubbing or cyanosis  Neurologic: Alert and oriented x4, Cranial nerve II-XII intact, Strength 5/5 in all 4 extremities    Procedures Performed: No admission procedures for hospital encounter.     Significant Diagnostic Studies: See Full reports for all details    No results for input(s): "WBC", "RBC", "HGB", "HCT", "MCV", "MCH", "MCHC", "RDW", "PLT", "MPV", "GRAN", "LYMPH", "MONO", "BASO", "NRBC" in the last 168 hours.    No results for input(s): "NA", "K", "CL", "CO2", "ANIONGAP", "BUN", "CREATININE", "GLU", "CALCIUM", "PH", "MG", "ALBUMIN", "PROT", "ALKPHOS", "ALT", "AST", "BILITOT" in the last 168 hours.     Microbiology Results (last 7 days)       ** No results found for the last 168 hours. **             Echo    Left Ventricle: The left ventricle is normal in size. Normal wall   thickness. Normal wall motion. There is normal systolic function with a   visually estimated ejection fraction of 55 - 60%. There is normal   diastolic function.    Right Ventricle: Normal right ventricular cavity size. Systolic   function is normal. TAPSE is 1.80 cm.    No significant valvular abnormalities noted in this study.         Medication List        START taking these medications      risperiDONE 0.5 MG Tab  Commonly known as: RISPERDAL  Take 1 tablet (0.5 mg total) by mouth 2 (two) times daily.            ASK your doctor about these medications      levoFLOXacin 750 MG tablet  Commonly known as: LEVAQUIN  Take 1 tablet (750 mg total) by mouth once daily. for 3 days  Ask about: Should I take this medication?             "   Where to Get Your Medications        You can get these medications from any pharmacy    Bring a paper prescription for each of these medications  levoFLOXacin 750 MG tablet  risperiDONE 0.5 MG Tab          Explained in detail to the patient about the discharge plan, medications, and follow-up visits. Pt understands and agrees with the treatment plan  Discharge Disposition: Psychiatric Hospital   Discharged Condition: stable  Diet-    Medications Per DC med rec  Activities as tolerated    For further questions contact hospitalist office    Discharge time 33 minutes    For worsening symptoms, chest pain, shortness of breath, increased abdominal pain, high grade fever, stroke or stroke like symptoms, immediately go to the nearest Emergency Room or call 911 as soon as possible.      Christian So M.D.

## 2024-02-16 ENCOUNTER — HOSPITAL ENCOUNTER (EMERGENCY)
Facility: HOSPITAL | Age: 29
Discharge: HOME | End: 2024-02-16
Attending: EMERGENCY MEDICINE
Payer: MEDICAID

## 2024-02-16 VITALS
TEMPERATURE: 98 F | SYSTOLIC BLOOD PRESSURE: 138 MMHG | OXYGEN SATURATION: 99 % | WEIGHT: 227.06 LBS | HEART RATE: 80 BPM | RESPIRATION RATE: 19 BRPM | DIASTOLIC BLOOD PRESSURE: 80 MMHG

## 2024-02-16 DIAGNOSIS — S01.01XA LACERATION OF SCALP, INITIAL ENCOUNTER: Primary | ICD-10-CM

## 2024-02-16 PROCEDURE — 90715 TDAP VACCINE 7 YRS/> IM: CPT | Performed by: PHYSICIAN ASSISTANT

## 2024-02-16 PROCEDURE — 12002 RPR S/N/AX/GEN/TRNK2.6-7.5CM: CPT

## 2024-02-16 PROCEDURE — 99284 EMERGENCY DEPT VISIT MOD MDM: CPT | Mod: 25

## 2024-02-16 PROCEDURE — 90471 IMMUNIZATION ADMIN: CPT | Performed by: PHYSICIAN ASSISTANT

## 2024-02-16 PROCEDURE — 63600175 PHARM REV CODE 636 W HCPCS: Performed by: PHYSICIAN ASSISTANT

## 2024-02-16 RX ADMIN — TETANUS TOXOID, REDUCED DIPHTHERIA TOXOID AND ACELLULAR PERTUSSIS VACCINE, ADSORBED 0.5 ML: 5; 2.5; 8; 8; 2.5 SUSPENSION INTRAMUSCULAR at 05:02

## 2024-02-28 LAB — PATH REV: NORMAL
